# Patient Record
Sex: FEMALE | Race: WHITE
[De-identification: names, ages, dates, MRNs, and addresses within clinical notes are randomized per-mention and may not be internally consistent; named-entity substitution may affect disease eponyms.]

---

## 2018-07-11 ENCOUNTER — HOSPITAL ENCOUNTER (EMERGENCY)
Dept: HOSPITAL 25 - ED | Age: 38
Discharge: HOME | End: 2018-07-11
Payer: COMMERCIAL

## 2018-07-11 VITALS — SYSTOLIC BLOOD PRESSURE: 118 MMHG | DIASTOLIC BLOOD PRESSURE: 63 MMHG

## 2018-07-11 DIAGNOSIS — R07.89: Primary | ICD-10-CM

## 2018-07-11 DIAGNOSIS — F41.0: ICD-10-CM

## 2018-07-11 DIAGNOSIS — R06.02: ICD-10-CM

## 2018-07-11 DIAGNOSIS — R94.31: ICD-10-CM

## 2018-07-11 DIAGNOSIS — K52.9: ICD-10-CM

## 2018-07-11 DIAGNOSIS — R42: ICD-10-CM

## 2018-07-11 DIAGNOSIS — F17.210: ICD-10-CM

## 2018-07-11 LAB
HCT VFR BLD AUTO: 35 % (ref 35–47)
HGB BLD-MCNC: 12.2 G/DL (ref 12–16)
MCH RBC QN AUTO: 30 PG (ref 27–31)
MCHC RBC AUTO-ENTMCNC: 35 G/DL (ref 31–36)
MCV RBC AUTO: 86 FL (ref 80–97)
PLATELET # BLD AUTO: 187 10^3/UL (ref 150–450)
RBC # BLD AUTO: 4.05 10^6/UL (ref 4–5.4)
WBC # BLD AUTO: 7.6 10^3/UL (ref 3.5–10.8)

## 2018-07-11 PROCEDURE — 84484 ASSAY OF TROPONIN QUANT: CPT

## 2018-07-11 PROCEDURE — 99284 EMERGENCY DEPT VISIT MOD MDM: CPT

## 2018-07-11 PROCEDURE — 96360 HYDRATION IV INFUSION INIT: CPT

## 2018-07-11 PROCEDURE — 36415 COLL VENOUS BLD VENIPUNCTURE: CPT

## 2018-07-11 PROCEDURE — 85027 COMPLETE CBC AUTOMATED: CPT

## 2018-07-11 PROCEDURE — 93005 ELECTROCARDIOGRAM TRACING: CPT

## 2018-07-11 PROCEDURE — 84702 CHORIONIC GONADOTROPIN TEST: CPT

## 2018-07-11 PROCEDURE — 80053 COMPREHEN METABOLIC PANEL: CPT

## 2018-07-11 RX ADMIN — SODIUM CHLORIDE ONE MLS/HR: 900 IRRIGANT IRRIGATION at 18:02

## 2018-07-11 NOTE — ED
Progress





- Progress Note


Progress Note: 


This patient was signed out from Dr. Lopez to Dr. Mccarthy. The patient is 

feeling better and is pain free. In the ED she has been having no problems. The 

patient will be D/C with dx of atypical CP and gastroenteritis. The patient is 

agreeable with this plan.





Course/Dx





- Course


Course Of Treatment: I do not suspect acute coronary syndrome, she had a 

coincidental panic attack, she has been having dehydration and likely as well 

stomach irritation from soda and coffee, poor oral intake. Pt did exhibit 

hyperventilation syndrome. Plan for fluid replacement.  EKG reveals nl sinus 

rythym at 64 BPM, no STEMI.  Pt was given nl saline.





- Diagnoses


Provider Diagnoses: 


 Atypical chest pain, Gastroenteritis








Discharge





- Sign-Out/Discharge


Documenting (check all that apply): Patient Departure, Receiving Sign-Out


Receiving patient FROM: Gary Lopez





- Discharge Plan


Condition: Stable


Disposition: HOME


Patient Education Materials:  Chest Pain (ED), Gastroenteritis (ED)


Forms:  *Work Release


Referrals: 


Caitlin Velasquez MD [Primary Care Provider] - 3 Days


Additional Instructions: 


RETURN TO THE EMERGENCY DEPARTMENT FOR CHANGING OR WORSENING SYMPTOMS.

## 2018-07-11 NOTE — ED
HPI Chest Pain





- History of Current Complaint


Chief Complaint: EDChestPainROMI


Time Seen by Provider: 07/11/18 17:10


Hx Obtained From: Patient


Onset/Duration: Started Hours Ago, Still Present


Timing: Constant, Lasting Hours


Initial Severity: Moderate


Current Severity: Mild


Pain Intensity: 0


Pain Scale Used: 0-10 Numeric


Chest Pain Location: Mid Sternal


Chest Pain Radiates: No


Character: Dyspnea at Rest, Tightness


Aggravating Factor(s): Other: - Dehydration, anxiety, poor oral intake


Alleviating Factor(s): Nothing


Associated Signs and Symptoms: Positive: Chest Pain, Anxiety, Recent Stress - 

Illness, Tingling, Weakness, Shortness of Breath, Lightheadedness, Nausea, 

Vomiting





- Allergy/Home Medications


Allergies/Adverse Reactions: 


 Allergies











Allergy/AdvReac Type Severity Reaction Status Date / Time


 


No Known Allergies Allergy   Verified 07/11/18 17:17











Home Medications: 


 Home Medications





NK [No Home Medications Reported]  07/11/18 [History Confirmed 07/11/18]











PMH/Surg Hx/FS Hx/Imm Hx


Endocrine/Hematology History: 


   Denies: Hx Diabetes


Cardiovascular History: 


   Denies: Hx Hypertension, Hx Myocardial Infarction


Sensory History: 


   Denies: Hx Legally Blind


Opthamlomology History: 


   Denies: Hx Legally Blind


EENT History: 


   Denies: Hx Deafness


Psychiatric History: Reports: Hx Anxiety, Hx Panic Disorder


Infectious Disease History: No


Infectious Disease History: 


   Denies: Traveled Outside the [place] in Last 30 Days





- Family History


Known Family History: 


   Negative: Cardiac Disease





- Social History


Occupation: Employed Full-time


Hx Tobacco Use: Yes


Smoking Status (MU): Current Every Day Smoker


Type: Cigarettes





Review of Systems


Negative: Fever, Chills


Negative: Erythema


Negative: Sore Throat


Positive: Chest Pain


Positive: Shortness Of Breath.  Negative: Cough


Positive: Vomiting, Nausea.  Negative: Abdominal Pain


Negative: dysuria, hematuria


Negative: Myalgia, Edema


Negative: Rash


Neurological: Other - NEGATIVE: dizziness


Positive: Weakness, Paresthesia


Positive: Anxious


All Other Systems Reviewed And Are Negative: Yes





Physical Exam





- Summary


Physical Exam Summary: 





Constitutional: Well-developed, Well-nourished, Alert. (-) Distressed


Skin: Warm, Dry


HENT: Normocephalic; Atraumatic


Eyes: Conjunctiva normal


Neck: Musculoskeletal ROM normal neck. (-) JVD, (-) Stridor, (-) Tracheal 

deviation


Cardio: Rhythm regular, rate normal, Heart sounds normal; Intact distal pulses; 

The pedal pulses are 2+ and symmetric. Radial pulses are 2+ and symmetric. (-) 

Murmur


Pulmonary/Chest wall: Effort normal. (-) Respiratory distress, (-) Wheezes, (-) 

Rales


Abd: Soft, (-), epigastric tenderness, (-) Distension, (-) Guarding, (-) Rebound


Musculoskeletal: (-) Edema


Lymph: (-) Cervical adenopathy


Neuro: Alert, Oriented x3


Psych: Mood and affect Normal


Triage Information Reviewed: Yes


Vital Signs On Initial Exam: 


 Initial Vitals











Temp Pulse Resp BP Pulse Ox


 


 98.8 F   63   15   128/66   99 


 


 07/11/18 17:14  07/11/18 17:14  07/11/18 17:14  07/11/18 17:14  07/11/18 17:14











Vital Signs Reviewed: Yes





Diagnostics





- Vital Signs


 Vital Signs











  Temp Pulse Resp BP Pulse Ox


 


 07/11/18 17:14  98.8 F  63  15  128/66  99














- Laboratory


Result Diagrams: 


 07/11/18 17:23





 07/11/18 17:23


Lab Statement: Any lab studies that have been ordered have been reviewed, and 

results considered in the medical decision making process.





- EKG


  ** 1712


Cardiac Rate: NL - 64


EKG Rhythm: Sinus Rhythm


ST Segment: Normal


EKG Interpretation: No STEMI.





Chest Pain Course/Dx





- Course


Course Of Treatment: I do not suspect acute coronary syndrome, she had a 

coincidental panic attack, she has been having dehydration and likely as well 

stomach irritation from soda and coffee, poor oral intake. Pt did exhibit 

hyperventilation syndrome. Plan for fluid replacement.  EKG reveals nl sinus 

rythym at 64 BPM, no STEMI.  Pt was given nl saline.





Discharge





- Sign-Out/Discharge


Documenting (check all that apply): Sign-Out Patient


Signing out patient TO: Malgorzata Mccarthy - pending reeval, IV fluids





- Discharge Plan

## 2018-10-20 ENCOUNTER — HOSPITAL ENCOUNTER (EMERGENCY)
Dept: HOSPITAL 25 - ED | Age: 38
Discharge: HOME | End: 2018-10-20
Payer: COMMERCIAL

## 2018-10-20 VITALS — SYSTOLIC BLOOD PRESSURE: 122 MMHG | DIASTOLIC BLOOD PRESSURE: 65 MMHG

## 2018-10-20 DIAGNOSIS — F17.210: ICD-10-CM

## 2018-10-20 DIAGNOSIS — K08.89: Primary | ICD-10-CM

## 2018-10-20 PROCEDURE — 99282 EMERGENCY DEPT VISIT SF MDM: CPT

## 2018-10-20 PROCEDURE — 87651 STREP A DNA AMP PROBE: CPT

## 2018-10-20 NOTE — ED
Throat Pain/Nasal Congestion





- HPI Summary


HPI Summary: 


Pt presents w/ Rt lower dental pain since last week. She had a cavity filled 15 

yrs ago and filling recently fell out. Has had pain since last week when this 

happened. Then starting Wed, she develop diarrhea and subjective fevers/chills. 

Reduced appetite and vomiting this morning. Denies ab pain, rash, CP, SOB, 

difficulty breathing or swallowing, cough, sneezing. She has had some pain 

along Rt jaw radiating into ear and sinus on this side but no draniage or 

swelling. Kids are in school - possible sick exposure as pt's daughter had ab 

pain yesterday.





- History of Current Complaint


Chief Complaint: EDDentalPain


Time Seen by Provider: 10/20/18 14:12


Hx Obtained From: Patient





- Allergies/Home Medications


Allergies/Adverse Reactions: 


 Allergies











Allergy/AdvReac Type Severity Reaction Status Date / Time


 


No Known Allergies Allergy   Verified 07/11/18 17:17














PMH/Surg Hx/FS Hx/Imm Hx


Previously Healthy: Yes


Endocrine/Hematology History: 


   Denies: Hx Diabetes


Cardiovascular History: 


   Denies: Hx Hypertension, Hx Myocardial Infarction


Sensory History: 


   Denies: Hx Legally Blind, Hx Deafness


Opthamlomology History: 


   Denies: Hx Legally Blind


Psychiatric History: Reports: Hx Anxiety, Hx Panic Disorder


Infectious Disease History: No


Infectious Disease History: 


   Denies: Traveled Outside the [place] in Last 30 Days





- Family History


Known Family History: 


   Negative: Cardiac Disease





- Social History


Occupation: Unemployed -  - 4 kids


Lives: With Family


Alcohol Use: None


Substance Use Type: Reports: Marijuana


Substance Use Comment - Amount & Last Used: h/o opiate use - on suboxone and 

sober x 3 years


Hx Tobacco Use: Yes


Smoking Status (MU): Current Every Day Smoker


Type: Cigarettes


Amount Used/How Often: 1/2 PPD





Physical Exam


Vital Signs On Initial Exam: 


 Initial Vitals











Temp Pulse Resp BP Pulse Ox


 


 97.6 F   67   16   140/89   99 


 


 10/20/18 12:48  10/20/18 12:48  10/20/18 12:48  10/20/18 12:48  10/20/18 12:48











Appearance: Positive: Well-Appearing, No Pain Distress, Well-Nourished


Skin: Positive: Warm, Skin Color Reflects Adequate Perfusion, Dry - no erythema

, no edema, no lesions over Rt side of face/neck


Head/Face: Positive: Normal Head/Face Inspection


Eyes: Positive: Normal, EOMI, Conjunctiva Clear.  Negative: Conjunctiva 

Inflammed, Discharge


ENT: Positive: Hearing grossly normal, Pharynx normal, TMs normal, Sinus 

tenderness - Rt maxillary - mild, Uvula midline.  Negative: Nasal congestion, 

Nasal drainage, Tonsillar swelling, Tonsillar exudate, Trismus, Muffled voice, 

Hoarse voice


Dental: Positive: Gross Decay/Caries @ - #... - no erythema, no edema, no 

drainage - core of pulp is TTP


Neck: Positive: Supple, Nontender, No Lymphadenopathy.  Negative: Nuchal 

Rigidity


Respiratory/Lung Sounds: Positive: Clear to Auscultation, Breath Sounds 

Present.  Negative: Rales, Rhonchi, Wheezes


Cardiovascular: Positive: Normal, RRR, S1, S2.  Negative: Murmur, Rub


Abdomen Description: Positive: Nontender, No Organomegaly, Soft


Bowel Sounds: Positive: Present


Musculoskeletal: Positive: Normal, Strength/ROM Intact


Neurological: Positive: Normal, Sensory/Motor Intact, Alert, Oriented to Person 

Place, Time, CN Intact II-III


Psychiatric: Positive: Normal





Diagnostics





- Vital Signs


 Vital Signs











  Temp Pulse Resp BP Pulse Ox


 


 10/20/18 12:48  97.6 F  67  16  140/89  99














- Laboratory


Lab Results: 


 Lab Results











  10/20/18 10/20/18 Range/Units





  14:43 14:44 


 


Influenza A (Rapid)   Negative  (Negative)  


 


Influenza B (Rapid)   Negative  (Negative)  


 


Group A Strep Rapid  Negative   (Negative)  











Lab Statement: Any lab studies that have been ordered have been reviewed, and 

results considered in the medical decision making process.





EENT Course/Dx





- Course


Course Of Treatment: dental pain w/ possible infection - will start anbx and pt 

plans to f/u w/ dentist Monday.  Return to ED if danger s/sx present





- Diagnoses


Provider Diagnoses: 


 Pain, dental








Discharge





- Sign-Out/Discharge


Documenting (check all that apply): Patient Departure





- Discharge Plan


Condition: Stable


Disposition: HOME


Prescriptions: 


Clindamycin HCl 300 mg PO TID #30 capsule


Patient Education Materials:  Toothache (ED), Viral Syndrome (ED)


Referrals: 


Caitlin Velasquez MD [Primary Care Provider] - 


Additional Instructions: 


Follow-up with dentist Monday - call to schedule an appointment


You may also try conservative care such as satl water oral rinses, especially 

after meals


Alternate ibuprofen and acetaminophen for pain


You may also try warm compresses or ice (whichever feels better)


*If difficulty breathing or swallowing, return to the ED





- Billing Disposition and Condition


Condition: STABLE


Disposition: Home

## 2018-11-30 ENCOUNTER — HOSPITAL ENCOUNTER (EMERGENCY)
Dept: HOSPITAL 25 - ED | Age: 38
Discharge: HOME | End: 2018-11-30
Payer: COMMERCIAL

## 2018-11-30 DIAGNOSIS — T40.4X1A: ICD-10-CM

## 2018-11-30 DIAGNOSIS — F41.9: ICD-10-CM

## 2018-11-30 DIAGNOSIS — F32.9: Primary | ICD-10-CM

## 2018-11-30 DIAGNOSIS — F17.210: ICD-10-CM

## 2018-11-30 DIAGNOSIS — F19.10: ICD-10-CM

## 2018-11-30 LAB
BASOPHILS # BLD AUTO: 0.1 10^3/UL (ref 0–0.2)
EOSINOPHIL # BLD AUTO: 0.2 10^3/UL (ref 0–0.6)
HCT VFR BLD AUTO: 41 % (ref 35–47)
HGB BLD-MCNC: 13.8 G/DL (ref 12–16)
LYMPHOCYTES # BLD AUTO: 2.9 10^3/UL (ref 1–4.8)
MCH RBC QN AUTO: 29 PG (ref 27–31)
MCHC RBC AUTO-ENTMCNC: 34 G/DL (ref 31–36)
MCV RBC AUTO: 87 FL (ref 80–97)
MONOCYTES # BLD AUTO: 0.5 10^3/UL (ref 0–0.8)
NEUTROPHILS # BLD AUTO: 6.5 10^3/UL (ref 1.5–7.7)
NRBC # BLD AUTO: 0 10^3/UL
NRBC BLD QL AUTO: 0
PLATELET # BLD AUTO: 227 10^3/UL (ref 150–450)
RBC # BLD AUTO: 4.75 10^6/UL (ref 4–5.4)
WBC # BLD AUTO: 10.1 10^3/UL (ref 3.5–10.8)

## 2018-11-30 PROCEDURE — 80307 DRUG TEST PRSMV CHEM ANLYZR: CPT

## 2018-11-30 PROCEDURE — G0480 DRUG TEST DEF 1-7 CLASSES: HCPCS

## 2018-11-30 PROCEDURE — 80329 ANALGESICS NON-OPIOID 1 OR 2: CPT

## 2018-11-30 PROCEDURE — 99285 EMERGENCY DEPT VISIT HI MDM: CPT

## 2018-11-30 PROCEDURE — 36415 COLL VENOUS BLD VENIPUNCTURE: CPT

## 2018-11-30 PROCEDURE — 80320 DRUG SCREEN QUANTALCOHOLS: CPT

## 2018-11-30 PROCEDURE — 80053 COMPREHEN METABOLIC PANEL: CPT

## 2018-11-30 PROCEDURE — 84443 ASSAY THYROID STIM HORMONE: CPT

## 2018-11-30 PROCEDURE — 84484 ASSAY OF TROPONIN QUANT: CPT

## 2018-11-30 PROCEDURE — 85025 COMPLETE CBC W/AUTO DIFF WBC: CPT

## 2018-11-30 PROCEDURE — 81003 URINALYSIS AUTO W/O SCOPE: CPT

## 2018-11-30 PROCEDURE — 84702 CHORIONIC GONADOTROPIN TEST: CPT

## 2018-12-01 ENCOUNTER — HOSPITAL ENCOUNTER (INPATIENT)
Dept: HOSPITAL 25 - ED | Age: 38
LOS: 5 days | Discharge: HOME | DRG: 773 | End: 2018-12-06
Attending: PSYCHIATRY & NEUROLOGY | Admitting: PSYCHIATRY & NEUROLOGY
Payer: MEDICAID

## 2018-12-01 DIAGNOSIS — E66.9: ICD-10-CM

## 2018-12-01 DIAGNOSIS — N39.0: ICD-10-CM

## 2018-12-01 DIAGNOSIS — F17.200: ICD-10-CM

## 2018-12-01 DIAGNOSIS — Z87.442: ICD-10-CM

## 2018-12-01 DIAGNOSIS — F41.0: ICD-10-CM

## 2018-12-01 DIAGNOSIS — F90.9: ICD-10-CM

## 2018-12-01 DIAGNOSIS — B95.1: ICD-10-CM

## 2018-12-01 DIAGNOSIS — F32.9: ICD-10-CM

## 2018-12-01 DIAGNOSIS — F12.151: Primary | ICD-10-CM

## 2018-12-01 DIAGNOSIS — F11.20: ICD-10-CM

## 2018-12-01 LAB
BASOPHILS # BLD AUTO: 0 10^3/UL (ref 0–0.2)
EOSINOPHIL # BLD AUTO: 0.1 10^3/UL (ref 0–0.6)
HCT VFR BLD AUTO: 43 % (ref 35–47)
HGB BLD-MCNC: 14.1 G/DL (ref 12–16)
LYMPHOCYTES # BLD AUTO: 2.5 10^3/UL (ref 1–4.8)
MCH RBC QN AUTO: 29 PG (ref 27–31)
MCHC RBC AUTO-ENTMCNC: 33 G/DL (ref 31–36)
MCV RBC AUTO: 87 FL (ref 80–97)
MONOCYTES # BLD AUTO: 0.5 10^3/UL (ref 0–0.8)
NEUTROPHILS # BLD AUTO: 4.2 10^3/UL (ref 1.5–7.7)
NRBC # BLD AUTO: 0 10^3/UL
NRBC BLD QL AUTO: 0.1
PLATELET # BLD AUTO: 204 10^3/UL (ref 150–450)
RBC # BLD AUTO: 4.87 10^6/UL (ref 4–5.4)
RBC UR QL AUTO: (no result)
WBC # BLD AUTO: 7.5 10^3/UL (ref 3.5–10.8)
WBC UR QL AUTO: (no result)

## 2018-12-01 PROCEDURE — 80053 COMPREHEN METABOLIC PANEL: CPT

## 2018-12-01 PROCEDURE — 81003 URINALYSIS AUTO W/O SCOPE: CPT

## 2018-12-01 PROCEDURE — 87077 CULTURE AEROBIC IDENTIFY: CPT

## 2018-12-01 PROCEDURE — 81015 MICROSCOPIC EXAM OF URINE: CPT

## 2018-12-01 PROCEDURE — 99238 HOSP IP/OBS DSCHRG MGMT 30/<: CPT

## 2018-12-01 PROCEDURE — 83036 HEMOGLOBIN GLYCOSYLATED A1C: CPT

## 2018-12-01 PROCEDURE — 80329 ANALGESICS NON-OPIOID 1 OR 2: CPT

## 2018-12-01 PROCEDURE — 80061 LIPID PANEL: CPT

## 2018-12-01 PROCEDURE — 99231 SBSQ HOSP IP/OBS SF/LOW 25: CPT

## 2018-12-01 PROCEDURE — G0480 DRUG TEST DEF 1-7 CLASSES: HCPCS

## 2018-12-01 PROCEDURE — 85025 COMPLETE CBC W/AUTO DIFF WBC: CPT

## 2018-12-01 PROCEDURE — 99222 1ST HOSP IP/OBS MODERATE 55: CPT

## 2018-12-01 PROCEDURE — 80320 DRUG SCREEN QUANTALCOHOLS: CPT

## 2018-12-01 PROCEDURE — 87086 URINE CULTURE/COLONY COUNT: CPT

## 2018-12-01 PROCEDURE — 80307 DRUG TEST PRSMV CHEM ANLYZR: CPT

## 2018-12-01 PROCEDURE — 84443 ASSAY THYROID STIM HORMONE: CPT

## 2018-12-01 PROCEDURE — 36415 COLL VENOUS BLD VENIPUNCTURE: CPT

## 2018-12-01 PROCEDURE — 99284 EMERGENCY DEPT VISIT MOD MDM: CPT

## 2018-12-01 RX ADMIN — RISPERIDONE ONE: 1 TABLET ORAL at 21:08

## 2018-12-01 RX ADMIN — RISPERIDONE SCH: 1 TABLET ORAL at 23:39

## 2018-12-01 RX ADMIN — RISPERIDONE ONE MG: 1 TABLET ORAL at 21:14
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
## 2018-12-01 NOTE — ED
Progress





- Progress Note


Progress Note: 





The pt is a 38 y.[facility] She was signed out by Dr. Whitt to Dr. Arnold. Chief complaint was stated to be psychiatric complaint 

where she noted to be hearing voices.  





- Consult/PCP


Time Called: 17:00





Course/Dx





- Course


Course Of Treatment: The pt is a 38 y.o female signed out by Dr. Whitt. The dx 

will be psychosis The pt is currently pending MHE. Per  evaluator, pt was 

cleared and will be admitted to the [facility]. She was a voluntary admission to the 

[facility].





- Diagnoses


Provider Diagnoses: 


 Psychosis








Discharge





- Sign-Out/Discharge


Documenting (check all that apply): Patient Departure - Admission to 

Psychiatric facility





- Discharge Plan


Condition: Stable


Disposition: PSYCHIATRIC FACILITY-[facility]





- Billing Disposition and Condition


Condition: STABLE


Disposition: Psychiatric Facility [facility]





- Attestation Statements


Document Initiated by Scribe: Yes


Documenting Scribe: Talat Fierro


Provider For Whom Scribe is Documenting (Include Credential): Dr. Dee Arnold


Scribe Attestation: 


Talat MCCORMICK scribed for Dr. Dee Arnold on 12/02/18 at 0546. 


Scribe Documentation Reviewed: Yes


Provider Attestation: 


The documentation as recorded by the Talat mariscal accurately reflects 

the service I personally performed and the decisions made by Dr. Dee frye


Status of Scribe Document: Viewed

## 2018-12-02 LAB
RBC UR QL AUTO: (no result)
WBC UR QL AUTO: (no result)

## 2018-12-02 RX ADMIN — BUPRENORPHINE HYDROCHLORIDE, NALOXONE HYDROCHLORIDE SCH EACH: 8; 2 FILM, SOLUBLE BUCCAL; SUBLINGUAL at 20:55

## 2018-12-02 RX ADMIN — NICOTINE SCH PATCH: 21 PATCH TRANSDERMAL at 09:07

## 2018-12-02 RX ADMIN — THERA TABS SCH TAB: TAB at 09:07

## 2018-12-02 RX ADMIN — NICOTINE PRN MG: 4 INHALANT RESPIRATORY (INHALATION) at 09:08

## 2018-12-02 RX ADMIN — SERTRALINE HYDROCHLORIDE SCH MG: 100 TABLET, FILM COATED ORAL at 09:07

## 2018-12-02 RX ADMIN — BUPRENORPHINE HYDROCHLORIDE, NALOXONE HYDROCHLORIDE SCH EACH: 8; 2 FILM, SOLUBLE BUCCAL; SUBLINGUAL at 09:07

## 2018-12-02 RX ADMIN — RISPERIDONE SCH MG: 1 TABLET ORAL at 20:54

## 2018-12-02 RX ADMIN — ACETAMINOPHEN PRN MG: 325 TABLET ORAL at 20:54

## 2018-12-02 RX ADMIN — WATER SCH NOTE: 100 INJECTION, SOLUTION INTRAVENOUS at 21:56

## 2018-12-02 NOTE — HP
HISTORY AND PHYSICAL:

 

DATE OF ADMISSION:  12/01/18

 

IDENTIFYING DATA:  Gianna is a 38-year-old   female with at 
least 3 prior psychiatric hospitalizations who was brought in to the emergency 
department by her  due to experiencing paranoia, both auditory or visual 
hallucinations.

 

HISTORY OF PRESENT ILLNESS:  The patient has been hearing multiple voices of 
known people who were having conversations among themselves and at times seeing 
people in the field behind her home.  However, today during the evaluation, she 
denied experiencing any visual hallucinations, although she heard the same 
voices once or twice during the day.  Otherwise, she denies any mood symptoms 
at this time.  She and her  got  for approximately a week due 
to not getting along and fighting in front of the kids.  Gianna moved out of the 
house into her mother's and stayed there away from her .  She took her 
children with her.  She also reports that she has been eating and sleeping 
well.  Her  reported that in recent past, Gianna scratched herself on the 
chest with a broken butter knife. Gianna reports of having financial, emotional, 
and work-related problems.

 

PAST PSYCHIATRIC HISTORY:  As mentioned in the HPI, this is her 4th psychiatric 
hospitalization.  Last hospitalization on this unit was on 01/06/16.  Prior to 
that, she was hospitalized in 2014 and 2012.  Her prior hospitalizations were 
almost under similar circumstances.

 

PAST MEDICAL HISTORY:  Unremarkable.

 

ALLERGIES:  No known drug allergies.

 

SUBSTANCE ABUSE HISTORY:  Gianna appears to be minimizing her substance use 
history. Her  urine drug screen was positive for cannabinoids as well as 
benzodiazepines. During the evaluation in the emergency room, she reported to 
the ED provider that she overdosed on multiple Suboxone.  Today, she reports 
that she has been smoking marijuana at least couple of times daily and she has 
been doing it for years.  In the past, for approximately 5 or 6 years, she was 
using heroin and methamphetamine, used IV drugs.  For last 3 to 4 years, she 
has been on Suboxone and reports that she had used heroin in between for brief 
period of time.

 

FAMILY PSYCHIATRIC HISTORY:  Unremarkable.

 

PERSONAL AND SOCIAL HISTORY:  Gianna dropped out of high school, but later 
received GED.  She has been living with her  up until a week ago; however
, moved out to her mother's because of relationship problem.  She was  
to her  in 2013 and has 1 son.  She has 2 other sons from prior 
relationships.

 

                               PHYSICAL EXAMINATION

 

GENERAL:  Physical exam was offered, but Gianna declined because in her opinion 
she had a recent physical exam in the emergency department.  Review of 
emergency records indicate that she does not have any acute or chronic physical 
health conditions.  At this time, she is not in any acute distress.

 

VITAL SIGNS:  Show a blood pressure of 122/80, pulse 73, respirations 18, 
temperature 97.4, pulse ox 99% on room air.

 

Gianna is a well-built, average height, moderately obese,  female, who 
is appropriately dressed and neatly groomed.

 

 LABORATORY DATA:  Review of labs shows a WBC count of 7.5, hemoglobin 14.1, 
hematocrit 43, platelets 204.  Chemistry profile shows serum sodium level of 141
, potassium 3.3 slightly lower than normal range, chloride 104, carbon dioxide 
28, BUN 11, creatinine 0.79.  Rest of the report appears to be unremarkable 
except for toxicology screen shows positive for cannabinoids and 
benzodiazepines.

 

MENTAL STATUS EXAM:  Gianna is alert and oriented to time, place, and person.  
Makes good eye contact.  Speech is normal in all spheres.  Describes her mood 
as "fine." Observed affect appears to be euthymic.  Her intelligence appears to 
be average as evidenced by her vocabulary and fund of knowledge.  Memory 
functions are intact in all spheres.  Reports of experiencing auditory 
hallucinations at this time of multiple known voices conversing among each other
, which is much less than it was the entire last week.  Denies experiencing any 
visual hallucinations.  Her thought processes are logical and goal directed.  
Denies any paranoia at this time, although she reported of having paranoid 
thoughts prior to admission.  Insight and judgement appears to be poor.

 

SUMMARY:  This 38-year-old ,  female, mother of 3 children with 
known history of mood and psychotic symptoms in the context of substance use.  
She was hospitalized 3 times prior to this because of same reason.

 

DIAGNOSTIC IMPRESSION:

 

MENTAL HEALTH DIAGNOSIS:  Substance-induced psychosis, rule out other psychotic 
disorders including schizophrenia.

 

PHYSICAL HEALTH DIAGNOSES:  None.

 

TREATMENT RECOMMENDATIONS:  Gianna will remain hospitalized on behavioral 
science unit for her safety and rapid stabilization of psychotic symptoms.  
Supportive milieu, individual and group therapy will be initiated.  Her code 
status will remain full.  She will be continued on her outpatient medications 
plus Risperdal 1 mg at h.s.  Psychopharmacological treatment will be deferred 
to her assigned psychiatrist on the unit, if there is any need for adjustment 
to the dose or change of medications.  It looks like her psychosis was related 
to substance use and the hospitalization should be a very brief one.  Gianna 
will benefit from referral to a substance use rehab followed by regular 
attendance to self-help groups.  Gianna at this time does not have any 
psychiatrist and her Suboxone is prescribed by her primary care physician.

 

 

 

[id number]/[facility] #: [id number]

MTDD

## 2018-12-03 RX ADMIN — WATER SCH NOTE: 100 INJECTION, SOLUTION INTRAVENOUS at 20:46

## 2018-12-03 RX ADMIN — ACETAMINOPHEN PRN MG: 325 TABLET ORAL at 06:10

## 2018-12-03 RX ADMIN — ACETAMINOPHEN PRN MG: 325 TABLET ORAL at 18:34

## 2018-12-03 RX ADMIN — NICOTINE SCH PATCH: 21 PATCH TRANSDERMAL at 08:34

## 2018-12-03 RX ADMIN — THERA TABS SCH TAB: TAB at 08:36

## 2018-12-03 RX ADMIN — SERTRALINE HYDROCHLORIDE SCH MG: 100 TABLET, FILM COATED ORAL at 08:35

## 2018-12-03 RX ADMIN — BUPRENORPHINE HYDROCHLORIDE, NALOXONE HYDROCHLORIDE SCH EACH: 8; 2 FILM, SOLUBLE BUCCAL; SUBLINGUAL at 20:33

## 2018-12-03 RX ADMIN — CEFDINIR SCH MG: 300 CAPSULE ORAL at 16:24

## 2018-12-03 RX ADMIN — BUPRENORPHINE HYDROCHLORIDE, NALOXONE HYDROCHLORIDE SCH EACH: 8; 2 FILM, SOLUBLE BUCCAL; SUBLINGUAL at 08:37

## 2018-12-03 NOTE — PN
Subjective





- Subjective


Date of Service: 12/03/18


Service Type: 96999 Hosp care 15 min low complexity


Subjective: 





Patient was seen by self, discussed with treatment team, chart was reviewed. 

Patient has been compliant with her medications, no reported side effects. 

Patient reports further improvement in her symptoms of psychosis of 

hallucination , no paranoia reported. Patient reports that she has not used any 

benzodiazepine and when educated about her urine toxicology reported that her 

marijuana might have been laced with other substances. Patient reported no 

history of sustained psychotic symptoms in the past. Patient sleeping has been 

fair with Risperidone. Patient eating has been fair. Patient has been 

cooperative with staff and attending groups. Patient behavior has been in 

control. Patient mood was less anxious and less dysphoric. Patient has been 

reporting no suicidal or homicidal ideation and willing to work with outpatient 

substance abuse treatment and setting up goals for her self at the house to 

help abstain from substance and prevent relapse. Patient urine culture was 

positive for Group B strep and reports having urinary frequency and some pain 

pain.





Objective





- Appearance


Appearance: Healthy Appearing


Dysmorphic Features: No


Hygiene: Normal


Grooming: Fairly Well Kept





- Behavior


Psychomotor Activities: Normal


Exhibits Abnormal Movement: No





- Attitude and Relatedness


Attitude and Relatedness: Cooperative


Eye Contact: Fair





- Speech


Quality: Unpressured


Latencies: Normal


Quantity: Terse





- Mood


Patient's Decription of Mood: "Anxious"





- Affect


Observed Affect: Fair


Affect Consistent with: Dysphoria - less





- Thought Process


Patient's Thought Process: Goal Directed


Thought Content: No Passive Death Wish, No Suicidal Planning, No Homicidal 

Ideation, No Paranoid Ideation





- Sensorium


Experiencing Hallucinations: No, Sensorium is Clear


Type of Hallucinations: Visual: No, Auditory: No, Command: No





- Level of Consciousness


Level of Consciousness: Alert


Orientation: No Intact, No Orientated to Time, No Orientated to Place, No 

Orientated to Person





- Impulse Control


Impulse Control: Intact





- Insight and Judgement


Insight and Judgement: Fair





- Group Participation


Particating in Group Activities: Yes





- Medication Management


Medication Management Adherence: Yes





Assessment





- Assessment


Merits Inpatient Hospitalization: For Immediate Safety, For Stabilization, For 

Discharge Planning


Inpatient DSM-V Dx: F12.151


Clinical Impression: 





This 38-year-old ,  female, mother of 3 children with known 

history of mood and psychotic symptoms in the context of substance use.  She 

was hospitalized 3 times prior to this because of same reason.





MHU: Problem List





- Patient Problems


(1) Substance-induced psychotic disorder


Current Visit: Yes   Status: Acute   Code(s): F19.959 - OTH PSYCHOACTV 

SUBSTANCE USE, UNSP W PSYCH DISORDER, UNSP   SNOMED Code(s): 696857403519659267


   





(2) Major depression


Current Visit: No   Status: Acute   Priority: High   Onset Date: 01/05/16   Code

(s): F32.9 - MAJOR DEPRESSIVE DISORDER, SINGLE EPISODE, UNSPECIFIED   SNOMED 

Code(s): 998227816


   





(3) Anxiety


Current Visit: No   Status: Chronic   Priority: High   Onset Date: 12/13/14   

Code(s): F41.9 - ANXIETY DISORDER, UNSPECIFIED   SNOMED Code(s): 79518584


   





(4) Hx of opioid abuse


Current Visit: No   Status: Chronic   Priority: Medium   Code(s): Z87.898 - 

PERSONAL HISTORY OF OTHER SPECIFIED CONDITIONS   SNOMED Code(s): 597173607


   Comment: takes opiates daily   





Plan





- Plan


Treatment Plan: 


Name: WINTER RUGGIERO                        


YOB: 1980                        


[id number]


[id number]








- Patient continues to be hospitalized due to psychosis, impulsive behavior, 

substance abuse and depression.


- Patient's medications were adjusted after informed consent with switching of 

Risperidone to Seroquel 25 mg at bedtime to help with sleep as psychotic 

symptoms has nearly resolved with short Risperidone trial. Patient to continue 

with Zoloft 200 mg PO QAM. Continue with Suboxone 8 mg BID for now.


-Hospitalist on call was called to discuss UTI, patient was started on 

Cefpodoxime 200 mg PO Q12 HRS for three days.


- Patient will be monitored for improvement and side effects including relapse 

in psychosis. Risk and benefits were discussed.


- Patient was encouraged to continue his participation in the milieu, group and 

individual therapy.


Medications: 


 Current Medications





Acetaminophen (Tylenol Tab*)  650 mg PO Q4H PRN


   PRN Reason: PAIN or TEMP > 101 F


   Last Admin: 12/03/18 06:10 Dose:  650 mg


Al Hydrox/Mg Hydrox/Simethicone (Maalox Plus*)  30 ml PO Q4H PRN


   PRN Reason: INDIGESTION


   Last Admin: 12/02/18 22:18 Dose:  30 ml


Buprenorphine/Naloxone (Suboxone 8 Mg-2 Mg Sl Film)  1 each SL FILM BID [facility]


   Last Admin: 12/03/18 08:37 Dose:  1 each


Multivitamins (Theragran Tab*)  1 tab PO DAILY [facility]


   Last Admin: 12/03/18 08:36 Dose:  1 tab


Nicotine (Nicotine Inhaler*)  10 mg INH Q2H PRN


   PRN Reason: CRAVING


   Last Admin: 12/02/18 09:08 Dose:  10 mg


Nicotine (Nicotine Patch 21 Mg/24 Hr*)  1 patch TRANSDERM DAILY [facility]


   Last Admin: 12/03/18 08:34 Dose:  1 patch


Pharmacy Profile Note (Nicotine Patch Removal Note*)  1 note PATCH OFF 2100 [facility]


   Last Admin: 12/02/18 21:56 Dose:  1 note


Quetiapine Fumarate (Seroquel Tab*)  25 mg PO BEDTIME [facility]


Sertraline HCl (Zoloft*)  200 mg PO QAM [facility]


   Last Admin: 12/03/18 08:35 Dose:  200 mg

## 2018-12-04 RX ADMIN — THERA TABS SCH TAB: TAB at 08:52

## 2018-12-04 RX ADMIN — BUPRENORPHINE HYDROCHLORIDE, NALOXONE HYDROCHLORIDE SCH: 8; 2 FILM, SOLUBLE BUCCAL; SUBLINGUAL at 22:45

## 2018-12-04 RX ADMIN — CEFDINIR SCH MG: 300 CAPSULE ORAL at 13:47

## 2018-12-04 RX ADMIN — BUPRENORPHINE HYDROCHLORIDE, NALOXONE HYDROCHLORIDE SCH EACH: 8; 2 FILM, SOLUBLE BUCCAL; SUBLINGUAL at 08:50

## 2018-12-04 RX ADMIN — CEFDINIR SCH MG: 300 CAPSULE ORAL at 02:00

## 2018-12-04 RX ADMIN — WATER SCH NOTE: 100 INJECTION, SOLUTION INTRAVENOUS at 20:55

## 2018-12-04 RX ADMIN — Medication ONE EACH: at 07:34

## 2018-12-04 RX ADMIN — RISPERIDONE SCH MG: 1 TABLET ORAL at 20:48

## 2018-12-04 RX ADMIN — NICOTINE PRN MG: 4 INHALANT RESPIRATORY (INHALATION) at 07:34

## 2018-12-04 RX ADMIN — NICOTINE SCH PATCH: 21 PATCH TRANSDERMAL at 08:51

## 2018-12-04 RX ADMIN — SERTRALINE HYDROCHLORIDE SCH MG: 100 TABLET, FILM COATED ORAL at 08:51

## 2018-12-04 RX ADMIN — RISPERIDONE SCH MG: 1 TABLET ORAL at 10:40

## 2018-12-04 NOTE — PN
Subjective





- Subjective


Date of Service: 12/04/18


Service Type: 83325 Hosp care 15 min low complexity


Subjective: 





Patient was seen by self, discussed with treatment team, chart was reviewed. 

Patient has been compliant with her medications, no reported side effects. 

Patient was in distress today with anxiety reportedly had a relapse and 

worsening in symptoms of psychosis with paranoia and auditory hallucinations. 

Patient was responding to internal stimuli couldn't sleep last night. Patient 

eating has been fair. Patient has been cooperative with staff otherwise. 

Patient behavior was unpredictable but was safe on all checks. Patient mood was 

more anxious and dysphoric. Patient has been reporting suicidal ideation but no 

homicidal ideation and willing to work with outpatient substance abuse 

treatment and setting up goals for her self at the house to help abstain from 

substance and prevent relapse. Patient on antibiotic therapy for UTI.  





Objective





- Appearance


Appearance: Healthy Appearing


Dysmorphic Features: No


Hygiene: Normal


Grooming: Fairly Well Kept





- Behavior


Psychomotor Activities: Normal


Exhibits Abnormal Movement: No





- Attitude and Relatedness


Attitude and Relatedness: Cooperative


Eye Contact: Fair





- Speech


Quality: Unpressured


Latencies: Normal


Quantity: Terse





- Mood


Patient's Decription of Mood: "Anxious"





- Affect


Observed Affect: Tense


Affect Consistent with: Dysphoria





- Thought Process


Patient's Thought Process: Goal Directed


Thought Content: Yes Passive Death Wish, Yes Paranoid Ideation, No Suicidal 

Planning, No Homicidal Ideation





- Sensorium


Experiencing Hallucinations: No, Sensorium is Clear


Type of Hallucinations: Visual: No, Auditory: Yes, Command: No





- Level of Consciousness


Level of Consciousness: Alert


Orientation: Yes Intact, Yes Orientated to Time, Yes Orientated to Place, Yes 

Orientated to Person





- Impulse Control


Impulse Control: Intact





- Insight and Judgement


Insight and Judgement: Fair





- Group Participation


Particating in Group Activities: Yes





- Medication Management


Medication Management Adherence: Yes





Assessment





- Assessment


Merits Inpatient Hospitalization: For Immediate Safety, For Stabilization, For 

Discharge Planning


Inpatient DSM-V Dx: F12.151


Clinical Impression: 





This 38-year-old ,  female, mother of 3 children with known 

history of mood and psychotic symptoms in the context of substance use.  She 

was hospitalized 3 times prior to this because of same reason.





MHU: Problem List





- Patient Problems


(1) Substance-induced psychotic disorder


Current Visit: Yes   Status: Acute   Code(s): F19.959 - OTH PSYCHOACTV 

SUBSTANCE USE, UNSP W PSYCH DISORDER, UNSP   SNOMED Code(s): 771537322425789897


   





(2) Major depression


Current Visit: No   Status: Acute   Priority: High   Onset Date: 01/05/16   Code

(s): F32.9 - MAJOR DEPRESSIVE DISORDER, SINGLE EPISODE, UNSPECIFIED   SNOMED 

Code(s): 328578120


   





(3) Anxiety


Current Visit: No   Status: Chronic   Priority: High   Onset Date: 12/13/14   

Code(s): F41.9 - ANXIETY DISORDER, UNSPECIFIED   SNOMED Code(s): 67778174


   





(4) Hx of opioid abuse


Current Visit: No   Status: Chronic   Priority: Medium   Code(s): Z87.898 - 

PERSONAL HISTORY OF OTHER SPECIFIED CONDITIONS   SNOMED Code(s): 993067801


   Comment: takes opiates daily   





Plan





- Plan


Treatment Plan: 


Name: WINTER RUGGIERO                        


YOB: 1980                        


[id number]


[id number]








- Patient continues to be hospitalized due to psychosis, impulsive behavior, 

substance abuse and depression.


- Patient's Seroquel was discontinued and was started back on Risperidone 0.5 

mg PO QAM and 1 mg PO QHS. Patient to continue with Zoloft 200 mg PO QAM. 

Continue with Suboxone 8 mg BID for now.


-Patient on Antibiotic therapy of three days for UTI.


- Patient will be monitored for improvement and side effects including relapse 

in psychosis. Risk and benefits were discussed.


- Patient was encouraged to continue his participation in the milieu, group and 

individual therapy.


Medications: 


 Current Medications





Acetaminophen (Tylenol Tab*)  650 mg PO Q4H PRN


   PRN Reason: PAIN or TEMP > 101 F


   Last Admin: 12/03/18 18:34 Dose:  650 mg


Al Hydrox/Mg Hydrox/Simethicone (Maalox Plus*)  30 ml PO Q4H PRN


   PRN Reason: INDIGESTION


   Last Admin: 12/02/18 22:18 Dose:  30 ml


Buprenorphine/Naloxone (Suboxone 8 Mg-2 Mg Sl Film)  1 each SL FILM BID [facility]


   Last Admin: 12/04/18 08:50 Dose:  1 each


Cefdinir (Cefdinir Cap (Nf))  300 mg PO Q12H [facility]


   Stop: 12/06/18 02:01


   Last Admin: 12/04/18 02:00 Dose:  300 mg


Multivitamins (Theragran Tab*)  1 tab PO DAILY [facility]


   Last Admin: 12/04/18 08:52 Dose:  1 tab


Nicotine (Nicotine Inhaler*)  10 mg INH Q2H PRN


   PRN Reason: CRAVING


   Last Admin: 12/04/18 07:34 Dose:  10 mg


Nicotine (Nicotine Patch 21 Mg/24 Hr*)  1 patch TRANSDERM DAILY [facility]


   Last Admin: 12/04/18 08:51 Dose:  1 patch


Pharmacy Profile Note (Nicotine Patch Removal Note*)  1 note PATCH OFF 2100 [facility]


   Last Admin: 12/03/18 20:46 Dose:  1 note


Risperidone (Risperdal*)  0.5 mg PO DAILY [facility]


   Last Admin: 12/04/18 10:40 Dose:  0.5 mg


Risperidone (Risperdal*)  1 mg PO BEDTIME [facility]


Sertraline HCl (Zoloft*)  200 mg PO QAM [facility]


   Last Admin: 12/04/18 08:51 Dose:  200 mg

## 2018-12-05 RX ADMIN — SERTRALINE HYDROCHLORIDE SCH: 100 TABLET, FILM COATED ORAL at 12:00

## 2018-12-05 RX ADMIN — CEFDINIR SCH MG: 300 CAPSULE ORAL at 03:28

## 2018-12-05 RX ADMIN — THERA TABS SCH TAB: TAB at 11:56

## 2018-12-05 RX ADMIN — CEFDINIR SCH MG: 300 CAPSULE ORAL at 14:50

## 2018-12-05 RX ADMIN — BUPRENORPHINE HYDROCHLORIDE, NALOXONE HYDROCHLORIDE SCH: 8; 2 FILM, SOLUBLE BUCCAL; SUBLINGUAL at 12:00

## 2018-12-05 RX ADMIN — BUPRENORPHINE HYDROCHLORIDE, NALOXONE HYDROCHLORIDE SCH EACH: 8; 2 FILM, SOLUBLE BUCCAL; SUBLINGUAL at 20:22

## 2018-12-05 RX ADMIN — ACETAMINOPHEN PRN MG: 325 TABLET ORAL at 15:51

## 2018-12-05 RX ADMIN — NICOTINE PRN MG: 4 INHALANT RESPIRATORY (INHALATION) at 15:17

## 2018-12-05 RX ADMIN — NICOTINE SCH PATCH: 21 PATCH TRANSDERMAL at 15:16

## 2018-12-05 RX ADMIN — Medication ONE EACH: at 15:17

## 2018-12-05 RX ADMIN — RISPERIDONE SCH MG: 1 TABLET ORAL at 20:22

## 2018-12-05 RX ADMIN — RISPERIDONE SCH MG: 1 TABLET ORAL at 11:56

## 2018-12-05 RX ADMIN — NICOTINE SCH: 21 PATCH TRANSDERMAL at 12:00

## 2018-12-05 NOTE — PN
Subjective





- Subjective


Date of Service: 12/05/18


Service Type: 84489 Hosp care 15 min low complexity


Subjective: 





Patient was seen by self, discussed with treatment team, chart was reviewed. 

Patient was not compliant with her medications this morning, no reported side 

effects. Patient was focused on Seroquel making her psychotic and was tried to 

explain about low dose of Seroquel might not be sufficient to help her with 

psychotic symptoms that was controlled with Risperidone. Patient tolerated 

Risperidone well last night but this morning has been internally preoccupied 

with in her own thoughts pacing on the unit. Patient rationalizing not needing 

hospitalization and wanting to be discharge. Patient signed 72 hours asking for 

discharge. Patient  Patient was responding to internal stimuli couldn't sleep 

last night. Patient eating has been fair. Patient has been cooperative with 

staff otherwise. Patient behavior was unpredictable but was safe on all checks. 

Patient mood was more anxious and pacing on the unit. Patient reports no 

suicidal ideation and no homicidal ideation and willing to work with outpatient 

substance abuse treatment and setting up goals for her self at the house to 

help abstain from substance and prevent relapse. Patient took her antibiotic 

therapy for UTI.  





Objective





- Appearance


Appearance: Healthy Appearing


Dysmorphic Features: No


Hygiene: Normal


Grooming: Fairly Well Kept





- Behavior


Psychomotor Activities: Abnormal-Increased - pacing on the unit


Exhibits Abnormal Movement: No





- Attitude and Relatedness


Attitude and Relatedness: Psychotically Related - less cooperative


Eye Contact: Fair





- Speech


Quality: Unpressured


Latencies: Normal


Quantity: Terse





- Mood


Patient's Decription of Mood: "Irritable"





- Affect


Observed Affect: Tense


Affect Consistent with: Dysphoria





- Thought Process


Patient's Thought Process: Coherent


Thought Content: No Passive Death Wish, No Suicidal Planning, No Homicidal 

Ideation, No Paranoid Ideation - but internally preoccpied in her thougths





- Sensorium


Experiencing Hallucinations: No, Sensorium is Clear


Type of Hallucinations: Visual: No, Auditory: No, Command: No





- Level of Consciousness


Level of Consciousness: Alert


Orientation: Yes Intact, Yes Orientated to Time, Yes Orientated to Place, Yes 

Orientated to Person





- Impulse Control


Impulse Control: Poor





- Insight and Judgement


Insight and Judgement: Poor





- Group Participation


Particating in Group Activities: No





- Medication Management


Medication Management Adherence: No





Assessment





- Assessment


Merits Inpatient Hospitalization: For Immediate Safety, For Stabilization, For 

Discharge Planning


Inpatient DSM-V Dx: F12.151


Clinical Impression: 





This 38-year-old ,  female, mother of 3 children with known 

history of mood and psychotic symptoms in the context of substance use.  She 

was hospitalized 3 times prior to this because of same reason.





MHU: Problem List





- Patient Problems


(1) Substance-induced psychotic disorder


Current Visit: Yes   Status: Acute   Code(s): F19.959 - OTH PSYCHOACTV 

SUBSTANCE USE, UNSP W PSYCH DISORDER, UNSP   SNOMED Code(s): 389661317099933584


   





(2) Major depression


Current Visit: No   Status: Acute   Priority: High   Onset Date: 01/05/16   Code

(s): F32.9 - MAJOR DEPRESSIVE DISORDER, SINGLE EPISODE, UNSPECIFIED   SNOMED 

Code(s): 494471430


   





(3) Anxiety


Current Visit: No   Status: Chronic   Priority: High   Onset Date: 12/13/14   

Code(s): F41.9 - ANXIETY DISORDER, UNSPECIFIED   SNOMED Code(s): 08910446


   





(4) Hx of opioid abuse


Current Visit: No   Status: Chronic   Priority: Medium   Code(s): Z87.898 - 

PERSONAL HISTORY OF OTHER SPECIFIED CONDITIONS   SNOMED Code(s): 734455726


   Comment: takes opiates daily   





Plan





- Plan


Treatment Plan: 


Name: WINTER RUGGIERO                        


YOB: 1980                        


[id number]


[id number]








- Patient continues to be hospitalized due to psychosis, impulsive behavior, 

substance abuse and depression.


- Patient's was encouraged to take Risperidone 0.5 mg PO QAM and 1 mg PO QHS. 

Patient to continue with Zoloft 200 mg PO QAM. Continue with Suboxone 8 mg BID 

for now.


-Patient on Antibiotic therapy of three days for UTI.


- Patient will be monitored for improvement and side effects including relapse 

in psychosis. Risk and benefits were discussed.


- Patient was encouraged to continue his participation in the milieu, group and 

individual therapy.


Medications: 


 Current Medications





Acetaminophen (Tylenol Tab*)  650 mg PO Q4H PRN


   PRN Reason: PAIN or TEMP > 101 F


   Last Admin: 12/03/18 18:34 Dose:  650 mg


Al Hydrox/Mg Hydrox/Simethicone (Maalox Plus*)  30 ml PO Q4H PRN


   PRN Reason: INDIGESTION


   Last Admin: 12/02/18 22:18 Dose:  30 ml


Buprenorphine/Naloxone (Suboxone 8 Mg-2 Mg Sl Film)  1 each SL FILM BID [facility]


   Last Admin: 12/04/18 22:45 Dose:  Not Given


Cefdinir (Cefdinir Cap (Nf))  300 mg PO Q12H [facility]


   Stop: 12/06/18 02:01


   Last Admin: 12/05/18 03:28 Dose:  300 mg


Multivitamins (Theragran Tab*)  1 tab PO DAILY [facility]


   Last Admin: 12/04/18 08:52 Dose:  1 tab


Nicotine (Nicotine Inhaler*)  10 mg INH Q2H PRN


   PRN Reason: CRAVING


   Last Admin: 12/04/18 07:34 Dose:  10 mg


Nicotine (Nicotine Patch 21 Mg/24 Hr*)  1 patch TRANSDERM DAILY [facility]


   Last Admin: 12/04/18 08:51 Dose:  1 patch


Pharmacy Profile Note (Nicotine Patch Removal Note*)  1 note PATCH OFF 2100 [facility]


   Last Admin: 12/04/18 20:55 Dose:  1 note


Risperidone (Risperdal*)  0.5 mg PO DAILY [facility]


   Last Admin: 12/04/18 10:40 Dose:  0.5 mg


Risperidone (Risperdal*)  1 mg PO BEDTIME [facility]


   Last Admin: 12/04/18 20:48 Dose:  1 mg


Sertraline HCl (Zoloft*)  200 mg PO QAM [facility]


   Last Admin: 12/04/18 08:51 Dose:  200 mg

## 2018-12-06 VITALS — SYSTOLIC BLOOD PRESSURE: 101 MMHG | DIASTOLIC BLOOD PRESSURE: 69 MMHG

## 2018-12-06 RX ADMIN — SERTRALINE HYDROCHLORIDE SCH MG: 100 TABLET, FILM COATED ORAL at 08:38

## 2018-12-06 RX ADMIN — NICOTINE SCH PATCH: 21 PATCH TRANSDERMAL at 08:40

## 2018-12-06 RX ADMIN — WATER SCH: 100 INJECTION, SOLUTION INTRAVENOUS at 00:00

## 2018-12-06 RX ADMIN — BUPRENORPHINE HYDROCHLORIDE, NALOXONE HYDROCHLORIDE SCH EACH: 8; 2 FILM, SOLUBLE BUCCAL; SUBLINGUAL at 08:39

## 2018-12-06 RX ADMIN — CEFDINIR SCH MG: 300 CAPSULE ORAL at 02:00

## 2018-12-06 RX ADMIN — THERA TABS SCH TAB: TAB at 08:39

## 2018-12-06 RX ADMIN — RISPERIDONE SCH MG: 1 TABLET ORAL at 08:37

## 2018-12-06 NOTE — DS
Subjective





- Subjective


Service Types: [id number] [facility] complex over 30 min


Discharge Date: 12/06/18


Subjective: 





IDENTIFYING DATA:  Gianna is a 38-year-old   female with at 

least 3 prior psychiatric 


hospitalizations who was brought in to the emergency department by her  

due to experiencing paranoia, 


both auditory or visual hallucinations.





HISTORY OF PRESENT ILLNESS:  The patient has been hearing multiple voices of 

known people who were having 


conversations among themselves and at times seeing people in the field behind 

her home.  However, today during 


the evaluation, she denied experiencing any visual hallucinations, although she 

heard the same voices once or twice 


during the day.  Otherwise, she denies any mood symptoms at this time.  She and 

her  got  for 


approximately a week due to not getting along and fighting in front of the 

kids.  Gianna moved out of the house into her 


mother's and stayed there away from her .  She took her children with 

her.  She also reports that she has 


been eating and sleeping well.  Her  reported that in recent past, Gianna 

scratched herself on the chest with a 


broken butter knife. Gianna reports of having financial, emotional, and work-

related problems.





PAST PSYCHIATRIC HISTORY:  As mentioned in the HPI, this is her 4th psychiatric 

hospitalization.  Last 


hospitalization on this unit was on 01/06/16.  Prior to that, she was 

hospitalized in 2014 and 2012.  Her prior 


hospitalizations were almost under similar circumstances.





PAST MEDICAL HISTORY:  Unremarkable.


 


ALLERGIES:  No known drug allergies.





SUBSTANCE ABUSE HISTORY:  Gianna appears to be minimizing her substance use 

history. Her  urine drug screen 


was positive for cannabinoids as well as benzodiazepines. During the evaluation 

in the emergency room, she 


reported to the ED provider that she overdosed on multiple Suboxone.  Today, 

she reports that she has been 


smoking marijuana at least couple of times daily and she has been doing it for 

years.  In the past, for approximately 


5 or 6 years, she was using heroin and methamphetamine, used IV drugs.  For 

last 3 to 4 years, she has been on 


Suboxone and reports that she had used heroin in between for brief period of 

time.





FAMILY PSYCHIATRIC HISTORY:  Unremarkable.


 


PERSONAL AND SOCIAL HISTORY:  Gianna dropped out of high school, but later 

received GED.  She has been 


living with her  up until a week ago; however, moved out to her mother's 

because of relationship problem.  


She was  to her  in 2013 and has 1 son.  She has 2 other sons 

from prior relationships.


 


PHYSICAL EXAMINATION





GENERAL:  Physical exam was offered, but Gianna declined because in her opinion 

she had a recent physical exam 


in the emergency department.  Review of emergency records indicate that she 

does not have any acute or chronic 


physical health conditions.  At this time, she is not in any acute distress.





VITAL SIGNS:  Show a blood pressure of 122/80, pulse 73, respirations 18, 

temperature 97.4, pulse ox 99% on room 


air.


Gianna is a well-built, average height, moderately obese,  female, who 

is appropriately dressed and neatly 


groomed.





LABORATORY DATA:  Review of labs shows a WBC count of 7.5, hemoglobin 14.1, 

hematocrit 43, platelets 204.  


Chemistry profile shows serum sodium level of 141, potassium 3.3 slightly lower 

than normal range, chloride 104, 


carbon dioxide 28, BUN 11, creatinine 0.79.  Rest of the report appears to be 

unremarkable except for toxicology 


screen shows positive for cannabinoids and benzodiazepines.





MENTAL STATUS EXAM:  Gianna is alert and oriented to time, place, and person.  

Makes good eye contact.  Speech 


is normal in all spheres.  Describes her mood as "fine." Observed affect 

appears to be euthymic.  Her intelligence 


appears to be average as evidenced by her vocabulary and fund of knowledge.  

Memory functions are intact in all 


spheres.  Reports of experiencing auditory hallucinations at this time of 

multiple known voices conversing among 


each other, which is much less than it was the entire last week.  Denies 

experiencing any visual hallucinations.  Her 


thought processes are logical and goal directed.  Denies any paranoia at this 

time, although she reported of having 


paranoid thoughts prior to admission.  Insight and judgement appears to be poor.


 


SUMMARY:  This 38-year-old ,  female, mother of 3 children with 

known history of mood and 


psychotic symptoms in the context of substance use.  She was hospitalized 3 

times prior to this because of same 


reason.





DIAGNOSTIC IMPRESSION ON ADMISSION:  Substance-induced psychosis, rule out 

other psychotic disorders including





DIAGNOSTIC IMPRESSION ON DISCHARGE:  Substance-induced psychosis, Depressive 

Disorder unspecified, Opioid dependence on maintenance treatment 








Objective





- Appearance


Appearance: Healthy Appearing


Dysmorphic Features: No


Hygiene: Normal


Grooming: Fairly Well Kept





- Behavior


Psychomotor Activities: Normal


Exhibits Abnormal Movement: No





- Attitude and Relatedness


Attitude and Relatedness: Cooperative


Eye Contact: Fair





- Speech


Quality: Unpressured


Latencies: Normal


Quantity: Terse





- Mood


Patient's Decription of Mood: "Fine"





- Affect


Observed Affect: Fair


Affect Consistent with: Euthymia





- Thought Process


Patient's Thought Process: Coherent


Thought Content: No Passive Death Wish, No Suicidal Planning, No Homicidal 

Ideation, No Paranoid Ideation





- Sensorium


Experiencing Hallucinations: No, Sensorium is Clear


Type of Hallucinations: Visual: No, Auditory: No, Command: No





- Level of Consciousness


Level of Consciousness: Alert


Orientation: No Intact, No Orientated to Time, No Orientated to Place, No 

Orientated to Person





- Impulse Control


Impulse Control: Intact





- Insight and Judgement


Insight and Judgement: Fair





- Group Participation


Particating in Group Activities: Yes





- Medication Management


Medication Management Adherence: Yes





Treatment Course & Assessment


Clinical Course & Impression: 





This 38-year-old ,  female, mother of 3 children with known 

history of mood and psychotic symptoms in the context of substance use.  She 

was hospitalized 3 times prior to this because of same reason. Patient was 

hospitalized on behavioral science unit for her safety and rapid stabilization 

of psychotic symptoms.  Supportive milieu, individual and group therapy will be 

initiated.  Her code status was full.  She was continued on her outpatient 

medications plus Risperdal 1 mg at [facility] Gianna has been following up with [facility] 

for her Suboxone treatment. Patient  was reviewed and last suboxone dispense 

was on 11/26/18.





Patient reports compliance with treatment, further improvement in her symptoms 

of psychosis of hallucination, no paranoia reported. Patient reported that she 

has not used any benzodiazepine and when educated about her urine toxicology 

reported that her marijuana might have been laced with other substances. 

Patient reported no history of sustained psychotic symptoms in the past. 

Patient mood was less anxious and less dysphoric. Patient has been reporting no 

suicidal or homicidal ideation and willing to work with outpatient substance 

abuse treatment and setting up goals for her self at the house to help abstain 

from substance and prevent relapse. Patient urine culture was positive for 

Group B strep and reports having urinary frequency and some pain pain. Patient'

s medications were adjusted after informed consent with switching of 

Risperidone to Seroquel 25 mg at bedtime to help with sleep as psychotic 

symptoms has nearly resolved with short Risperidone trial. Patient to continue 

with Zoloft 200 mg PO QAM. Continue with Suboxone 8 mg BID for now. Hospitalist 

was called to discuss UTI, patient was started antibiotic therapy of three days.





Patient was in distress after switching to Quetiapine and lower dose to assist 

with sleep.  Patient anxiety reportedly had a relapse and worsening in symptoms 

of psychosis with paranoia and auditory hallucinations. Patient was responding 

to internal stimuli couldn't sleep last night. Patient behavior was 

unpredictable but was safe on all checks. Patient mood was more anxious and 

dysphoric. Patient's Seroquel was discontinued and was started back on 

Risperidone and increased to 0.5 mg PO QAM and 1 mg PO QHS. Patient to continue 

with Zoloft 200 mg PO QAM. Continue with Suboxone 8 mg BID for now. Patient was 

counseled about her substance/cannabis abuse. Patient wanted to wean her self 

slowly from Suboxone and was instructed to work with outpatient provider to 

help adjusting suboxone dose. 





Patient signed 72 hours asking for discharge. Patient improved in her psychotic 

symptoms after switching back to Risperidone. Family meeting was held with her 

partner. Partner agreed with discharge planning as well. Partner will assist 

patient with transition process and medication administration. Patient improved 

and was showing stability in her mood, anxiety and behavior, not suicidal and 

not homicidal. Patient was discussed with team as patient was doing better, was 

not a danger to self and others, did not meet criteria for involuntary 

hospitalization patient was discharged with plan to follow up outpatient 

treatment at [facility] and Substance abuse treatment outpatient. 


[facility]its Inpatient Hospitalization: No


Clear for Discharge: Adequate Clinical Respons, Acceptable Safety Profile, Low 

Utility of Inpt Care


Inpatient DSM-V Dx: F12.151





Discharge Planning





- Discharge Planning


Discharge Plan: Outpatient Follow Up


Outpatient Program: [facility]


Recommendations for Continuing Care: Substance Abuse Counseling


Medications: 





DISCHARGE MEDICATIONS:





Patient was provided 2 weeks of following medications:





Nicotine (Nicotine Patch 21 Mg/24 Hr*)  1 patch TRANSDERM DAILY [facility]


   Last Admin: 12/04/18 08:51 Dose:  1 patch


Risperidone (Risperdal*)  0.5 mg PO DAILY [facility]


   Last Admin: 12/04/18 10:40 Dose:  0.5 mg


Risperidone (Risperdal*)  1 mg PO BEDTIME [facility]


   Last Admin: 12/04/18 20:48 Dose:  1 mg


Sertraline HCl (Zoloft*)  200 mg PO QAM [facility]


   Last Admin: 12/04/18 08:51 Dose:  200 mg





Patient to continue with Suboxone and follow up outpatient but was not provided 

any prescription upon discharge.





Buprenorphine/Naloxone (Suboxone 8 Mg-2 Mg Sl Film)  1 each SL FILM BID [facility]


   Last Admin: 12/04/18 22:45 Dose:  Not Given


Discharge Planning: 


Prescriptions provided for discharge                  [x] Yes   [] No   





Follow up care details as per social work arrangements.


Patient response to discharge plan:   


                                                                 [x] eager for 

discharge


                                    [] agreeable with discharge plan


                                  [] ambivalent about discharge


                                   [] disagrees with discharge today

## 2018-12-25 ENCOUNTER — HOSPITAL ENCOUNTER (INPATIENT)
Dept: HOSPITAL 25 - ED | Age: 38
LOS: 8 days | Discharge: HOME | DRG: 776 | End: 2019-01-02
Attending: PSYCHIATRY & NEUROLOGY | Admitting: PSYCHIATRY & NEUROLOGY
Payer: COMMERCIAL

## 2018-12-25 DIAGNOSIS — F20.9: ICD-10-CM

## 2018-12-25 DIAGNOSIS — R45.851: ICD-10-CM

## 2018-12-25 DIAGNOSIS — Z81.8: ICD-10-CM

## 2018-12-25 DIAGNOSIS — F19.159: Primary | ICD-10-CM

## 2018-12-25 LAB
ALBUMIN SERPL BCG-MCNC: 4.7 G/DL (ref 3.2–5.2)
ALBUMIN/GLOB SERPL: 1.9 {RATIO} (ref 1–3)
ALP SERPL-CCNC: 70 U/L (ref 34–104)
ALT SERPL W P-5'-P-CCNC: 12 U/L (ref 7–52)
ANION GAP SERPL CALC-SCNC: 9 MMOL/L (ref 2–11)
APAP SERPL-MCNC: < 15 MCG/ML
AST SERPL-CCNC: 14 U/L (ref 13–39)
BASOPHILS # BLD AUTO: 0.1 10^3/UL (ref 0–0.2)
BUN SERPL-MCNC: 14 MG/DL (ref 6–24)
BUN/CREAT SERPL: 15.7 (ref 8–20)
CALCIUM SERPL-MCNC: 10.1 MG/DL (ref 8.6–10.3)
CHLORIDE SERPL-SCNC: 104 MMOL/L (ref 101–111)
EOSINOPHIL # BLD AUTO: 0.1 10^3/UL (ref 0–0.6)
GLOBULIN SER CALC-MCNC: 2.5 G/DL (ref 2–4)
GLUCOSE SERPL-MCNC: 115 MG/DL (ref 70–100)
HCG SERPL QL: < 0.6 MIU/ML
HCO3 SERPL-SCNC: 25 MMOL/L (ref 22–32)
HCT VFR BLD AUTO: 42 % (ref 35–47)
HGB BLD-MCNC: 14.3 G/DL (ref 12–16)
LYMPHOCYTES # BLD AUTO: 2.1 10^3/UL (ref 1–4.8)
MCH RBC QN AUTO: 29 PG (ref 27–31)
MCHC RBC AUTO-ENTMCNC: 34 G/DL (ref 31–36)
MCV RBC AUTO: 86 FL (ref 80–97)
MONOCYTES # BLD AUTO: 0.6 10^3/UL (ref 0–0.8)
NEUTROPHILS # BLD AUTO: 5.6 10^3/UL (ref 1.5–7.7)
NRBC # BLD AUTO: 0 10^3/UL
NRBC BLD QL AUTO: 0.1
PLATELET # BLD AUTO: 234 10^3/UL (ref 150–450)
POTASSIUM SERPL-SCNC: 3.9 MMOL/L (ref 3.5–5)
PROT SERPL-MCNC: 7.2 G/DL (ref 6.4–8.9)
RBC # BLD AUTO: 4.89 10^6/UL (ref 4–5.4)
SALICYLATES SERPL-MCNC: < 2.5 MG/DL (ref ?–30)
SODIUM SERPL-SCNC: 138 MMOL/L (ref 135–145)
TSH SERPL-ACNC: 2.87 MCIU/ML (ref 0.34–5.6)
WBC # BLD AUTO: 8.5 10^3/UL (ref 3.5–10.8)

## 2018-12-25 PROCEDURE — 99231 SBSQ HOSP IP/OBS SF/LOW 25: CPT

## 2018-12-25 PROCEDURE — 36415 COLL VENOUS BLD VENIPUNCTURE: CPT

## 2018-12-25 PROCEDURE — 85025 COMPLETE CBC W/AUTO DIFF WBC: CPT

## 2018-12-25 PROCEDURE — 84443 ASSAY THYROID STIM HORMONE: CPT

## 2018-12-25 PROCEDURE — 99284 EMERGENCY DEPT VISIT MOD MDM: CPT

## 2018-12-25 PROCEDURE — 80320 DRUG SCREEN QUANTALCOHOLS: CPT

## 2018-12-25 PROCEDURE — 80329 ANALGESICS NON-OPIOID 1 OR 2: CPT

## 2018-12-25 PROCEDURE — 84702 CHORIONIC GONADOTROPIN TEST: CPT

## 2018-12-25 PROCEDURE — 99222 1ST HOSP IP/OBS MODERATE 55: CPT

## 2018-12-25 PROCEDURE — 80307 DRUG TEST PRSMV CHEM ANLYZR: CPT

## 2018-12-25 PROCEDURE — G0480 DRUG TEST DEF 1-7 CLASSES: HCPCS

## 2018-12-25 PROCEDURE — 80053 COMPREHEN METABOLIC PANEL: CPT

## 2018-12-25 PROCEDURE — 99233 SBSQ HOSP IP/OBS HIGH 50: CPT

## 2018-12-25 PROCEDURE — 99232 SBSQ HOSP IP/OBS MODERATE 35: CPT

## 2018-12-25 NOTE — ED
Psychiatric Complaint





- HPI Summary


HPI Summary: 


This patient is a 38 year old F presenting [facility] a chief complaint of panic 

attack. She states she was at a [place] get together with her family when she 

began hyperventilating and feeling panicked. She denies anything in particular 

causing the panic attack. The patient takes Zoloft for her anxiety. She denies 

SI and HI and ETOH use today. She has a Hx of anxiety, depression, and panic 

disorder.








- History Of Current Complaint


Chief Complaint: [facility]entalHealth


Time Seen by Provider: 12/25/18 19:17


Hx Obtained From: Patient


Onset/Duration: Sudden Onset


Severity Initially: Mild


Severity Currently: Mild


Character: Anxious


Aggravating Factor(s): Nothing


Alleviating Factor(s): Nothing


Associated Signs And Symptoms: Positive: Negative


Related History: Positive For: Prior Psychiatric Issues


Has Suicidal: Denies: Thoughts, With A Plan


Has Homicidal: Denies: Thoughts, With A Plan





- Risk Factor(s)


Completed Suicide Risk Factors: White American





- Allergies/Home Medications


Allergies/Adverse Reactions: 


 Allergies











Allergy/AdvReac Type Severity Reaction Status Date / Time


 


No Known Allergies Allergy   Verified 12/01/18 16:24














PMH/Surg Hx/FS Hx/Imm Hx


Endocrine/Hematology History: 


   Denies: Hx Diabetes, Hx Thyroid Disease


Cardiovascular History: Reports: Other Cardiovascular Problems/Disorders - 

PALPATATIONS


   Denies: Hx Hypertension


Respiratory History: 


   Denies: Hx Asthma, Hx Chronic Obstructive Pulmonary Disease (COPD)


GI History: 


   Denies: Hx Ulcer


 History: Reports: Hx Kidney Stones, Hx Renal Disease - Kidney stones


Sensory History: Reports: Hx Eye Injury - Bilateral Benign Growths


   Denies: Hx Contacts or Glasses, Hx Hearing Aid


Opthamlomology History: Reports: Hx Eye Injury - Bilateral Benign Growths


   Denies: Hx Contacts or Glasses


Psychiatric History: Reports: Hx Anxiety, Hx Attention Deficit Hyperactivity 

Disorder, Hx Depression, Hx Panic Disorder, Hx Inpatient Treatment, Hx 

Community Mental Health Tx, Hx Schizophrenia, Hx Substance Abuse


   Denies: Hx Eating Disorder, Hx of Violent Episodes Against Others





- Surgical History


Surgery Procedure, Year, and Place: Kidney Stones





- Immunization History


Date of Tetanus Vaccine: not sure


Infectious Disease History: No


Infectious Disease History: 


   Denies: Hx Hepatitis, Hx Human Immunodeficiency Virus (HIV), Traveled 

Outside the [place] in Last 30 Days





- Family History


Known Family History: 


   Negative: Cardiac Disease, Hypertension, Diabetes, Blood Disorder





- Social History


Alcohol Use: None


Hx Substance Use: Yes


Substance Use Type: Reports: Heroin, Marijuana


Substance Use Comment - Amount & Last Used: Heroin (almost 3 years sober)  

marijuana (daily on a good day)


Hx Tobacco Use: Yes


Smoking Status (MU): Heavy Every Day Tobacco Smoker


Type: Cigarettes





Review of Systems


Negative: Fever


Positive: Anxious


All Other Systems Reviewed And Are Negative: Yes





Physical Exam





- Summary


Physical Exam Summary: 





VITAL SIGNS: Reviewed.


GENERAL:  Patient is a well-developed and nourished FEMALE who is lying 

comfortable in the stretcher. Patient is not in any acute respiratory distress.


HEAD AND FACE: No signs of trauma. No ecchymosis, hematomas or skull 

depressions. No sinus tenderness.


EYES: PERRLA, EOMI x 2, No injected conjunctiva, no nystagmus.


EARS: Hearing grossly intact. Ear canals and tympanic membranes are within 

normal limits.


MOUTH: Oropharynx within normal limits.


NECK: Supple, trachea is midline, no adenopathy, no JVD, no carotid bruit, no c-

spine tenderness, neck with full ROM.


CHEST: Symmetric, no tenderness at palpation


LUNGS: Clear to auscultation bilaterally. No wheezing or crackles.


CVS: Regular rate and rhythm, S1 and S2 present, no murmurs or gallops 

appreciated.


ABDOMEN: Soft, non-tender. No signs of distention. No rebound no guarding, and 

no masses palpated. Bowel sounds are normal.


EXTREMITIES: FROM in all major joints, no edema, no cyanosis or clubbing.


NEURO: Alert and oriented x 3. No acute neurological deficits. Speech is normal 

and follows commands.


SKIN: Dry and warm


PSYCH: Cooperative. Denies SI and HI. 





Triage Information Reviewed: Yes


Vital Signs On Initial Exam: 


 Initial Vitals











Temp Pulse Resp BP Pulse Ox


 


 98.4 F   94   16   126/87   97 


 


 12/25/18 19:13  12/25/18 19:13  12/25/18 19:13  12/25/18 19:13  12/25/18 19:13











Vital Signs Reviewed: Yes





Diagnostics





- Vital Signs


 Vital Signs











  Temp Pulse Resp BP Pulse Ox


 


 12/25/18 19:13  98.4 F  94  16  126/87  97














- Laboratory


Result Diagrams: 


 12/25/18 19:37





 12/25/18 19:37


Lab Statement: Any lab studies that have been ordered have been reviewed, and 

results considered in the medical decision making process.





Course/Dx





- Course


Course Of Treatment: This patient is a 38 year old F presenting [facility] 

chief complaint of panic attack. Patient medically cleared at 2156 awaiting E 

evaluation. E decided to involunarily admit the patient for psychosis, per 

Dr. Mckoy.





- Differential Dx/Clinical Impression


Differential Diagnosis/HQI/PQRI: Positive: Acute Psychosis


Provider Diagnosis: 


 Psychosis








Discharge





- Sign-Out/Discharge


Documenting (check all that apply): Patient Departure





- Discharge Plan


Condition: Stable


Disposition: ADMITTED TO [facility]


Referrals: 


No Primary Care Phys,NOPCP [Primary Care Provider] - 





- Billing Disposition and Condition


Condition: STABLE


Disposition: Admitted to [facility]





- Attestation Statements


Document Initiated by Sergei: Yes


Documenting Scribe: Henri Bobby


Provider For Whom Sergei is Documenting (Include Credential): Malgorzata Mccarthy MD


Scribe Attestation: 


Henri MCCORMICK scribed for Malgorzata Mccarthy MD on 12/26/18 at 0606. 


Scribe Documentation Reviewed: Yes


Provider Attestation: 


The documentation as recorded by the Henri mariscal accurately 

reflects the service I personally performed and the decisions made by Malgorzata frye MD


Status of Scribe Document: Viewed

## 2018-12-26 RX ADMIN — NICOTINE PRN MG: 4 INHALANT RESPIRATORY (INHALATION) at 12:58

## 2018-12-26 RX ADMIN — GLYCERIN PRN DROP: .002; .002; .01 SOLUTION/ DROPS OPHTHALMIC at 13:46

## 2018-12-26 RX ADMIN — SERTRALINE HYDROCHLORIDE SCH MG: 100 TABLET, FILM COATED ORAL at 09:47

## 2018-12-26 RX ADMIN — GLYCERIN PRN DROP: .002; .002; .01 SOLUTION/ DROPS OPHTHALMIC at 18:25

## 2018-12-26 RX ADMIN — THERA TABS SCH TAB: TAB at 09:47

## 2018-12-27 RX ADMIN — SERTRALINE HYDROCHLORIDE SCH MG: 100 TABLET, FILM COATED ORAL at 09:00

## 2018-12-27 RX ADMIN — RISPERIDONE SCH MG: 1 TABLET ORAL at 20:59

## 2018-12-27 RX ADMIN — NICOTINE SCH PATCH: 21 PATCH TRANSDERMAL at 15:52

## 2018-12-27 RX ADMIN — THERA TABS SCH TAB: TAB at 09:00

## 2018-12-27 NOTE — PN
Subjective





- Subjective


Date of Service: 12/27/18


Service Type: [id number] Hosp care 15 min low complexity


Subjective: 





Gianna was pleasant, but superficial today. She agreed to take 1 mg of Risperdal 

and 100 mg of Zoloft. She also requested Suboxone. Although she has been 

prescribed Suboxone that should have ended on the 17th of December and her 

urine drug screen was negative for opiates (which does not preclude her having 

taken Suboxone), she has a long history of substance use. The decision to give 

her Suboxone while inpatient is informed by our need to have her take an 

antipsychotic. When she is less psychotic she may be more able to make 

appropriate choices. Further, the focus of her stay here is her psychiatric 

stability rather than her potential withdrawal from opiates.





Objective





- Appearance


Appearance: Healthy Appearing


Dysmorphic Features: No


Hygiene: Normal


Grooming: Fairly Well Kept





- Behavior


Psychomotor Activities: Normal


Exhibits Abnormal Movement: No





- Attitude and Relatedness


Attitude and Relatedness: Superficially Cooperative


Eye Contact: Good





- Speech


Quality: Unpressured


Latencies: Short


Quantity: Terse





- Mood


Patient's Decription of Mood: "Fine"





- Affect


Observed Affect: Constricted


Affect Consistent with: Euthymia





- Thought Process


Patient's Thought Process: Coherent


Thought Content: Yes Paranoid Ideation, No Passive Death Wish, No Suicidal 

Planning, No Homicidal Ideation





- Sensorium


Experiencing Hallucinations: No, Sensorium is Clear


Type of Hallucinations: Visual: No, Auditory: No, Command: No





- Level of Consciousness


Level of Consciousness: Alert


Orientation: Yes Intact, Yes Orientated to Time, Yes Orientated to Place, Yes 

Orientated to Person





- Impulse Control


Impulse Control: Intact





- Insight and Judgement


Insight and Judgement: Poor





- Group Participation


Particating in Group Activities: No





- Medication Management


Medication Management Adherence: Yes





Assessment





- Assessment


Merits Inpatient Hospitalization: For Immediate Safety, For Stabilization


Inpatient DSM-V Dx: F20.9





Plan





- Plan


Treatment Plan: 


Name: GIANNA RUGGIERO                        


YOB: 1980                        


[id number]


[id number]











Continued Medication Management: Different Medication


Medications: 


 Current Medications





Acetaminophen (Tylenol Tab*)  650 mg PO Q4H PRN


   PRN Reason: PAIN or TEMP > 101 F


Al Hydrox/Mg Hydrox/Simethicone (Maalox Plus*)  30 ml PO Q4H PRN


   PRN Reason: INDIGESTION


Buprenorphine/Naloxone (Suboxone 8 Mg-2 Mg Sl Film)  1 each SL FILM DAILY [facility]


Device (Nicotine Mouth Piece*)  1 each INH .CARTRIDGE [facility]


   Last Admin: 12/26/18 12:58 Dose:  1 each


Guaifenesin (Robitussin*)  5 ml PO Q4H PRN


   PRN Reason: COUGH


Hydroxyzine HCl (Atarax Tab*)  50 mg PO Q4H PRN


   PRN Reason: ANXIETY


Multivitamins (Theragran Tab*)  1 tab PO DAILY [facility]


   Last Admin: 12/27/18 09:00 Dose:  1 tab


Nicotine (Nicotine Inhaler*)  10 mg INH Q2H PRN


   PRN Reason: CRAVING


   Last Admin: 12/26/18 12:58 Dose:  10 mg


Nicotine Polacrilex (Nicotine Gum*)  2 mg PO Q2H PRN


   PRN Reason: CRAVING


Polyvinyl Alcohol (Polyvinyl Alcohol 1.4% Opth*)  1 drop BOTH EYES Q2H PRN


   PRN Reason: DRY EYE


   Last Admin: 12/26/18 18:25 Dose:  1 drop


Risperidone (Risperdal*)  1 mg PO BEDTIME [facility]


Sertraline HCl (Zoloft*)  100 mg PO DAILY [facility]











- Discharge Plan


Discharge Plan: Outpatient Follow Up

## 2018-12-28 RX ADMIN — BUPRENORPHINE HYDROCHLORIDE, NALOXONE HYDROCHLORIDE SCH EACH: 8; 2 FILM, SOLUBLE BUCCAL; SUBLINGUAL at 08:31

## 2018-12-28 RX ADMIN — GLYCERIN PRN DROP: .002; .002; .01 SOLUTION/ DROPS OPHTHALMIC at 09:27

## 2018-12-28 RX ADMIN — THERA TABS SCH TAB: TAB at 08:31

## 2018-12-28 RX ADMIN — NICOTINE PRN MG: 4 INHALANT RESPIRATORY (INHALATION) at 20:55

## 2018-12-28 RX ADMIN — RISPERIDONE SCH MG: 1 TABLET ORAL at 20:41

## 2018-12-28 RX ADMIN — SERTRALINE HYDROCHLORIDE SCH MG: 100 TABLET, FILM COATED ORAL at 08:31

## 2018-12-28 RX ADMIN — NICOTINE PRN MG: 4 INHALANT RESPIRATORY (INHALATION) at 06:05

## 2018-12-28 RX ADMIN — ACETAMINOPHEN PRN MG: 325 TABLET ORAL at 18:59

## 2018-12-28 RX ADMIN — NICOTINE SCH PATCH: 21 PATCH TRANSDERMAL at 09:25

## 2018-12-28 RX ADMIN — NICOTINE PRN MG: 4 INHALANT RESPIRATORY (INHALATION) at 17:03

## 2018-12-28 RX ADMIN — GLYCERIN PRN DROP: .002; .002; .01 SOLUTION/ DROPS OPHTHALMIC at 16:48

## 2018-12-28 NOTE — PN
Subjective





- Subjective


Date of Service: 12/28/18


Service Type: 80404 Hosp care 25 min moderate complexity


Subjective: 





Gianna reports feeling well and happy. She also indicates that she is not having 

psychotic thoughts at all. She feels like she is entirely better.We discuss 

with her possibilities of other medications, including Invega, as well as Consta

, Sustenna, Nikki, and Maintena. She is not interested in an injectable, however

, stating she is a former addict and needles would be triggering for her. 

Nevertheless, the conversation was started and she was given adequate 

information to make an informed decision. She states she feels like Risperdal 

works for her and she is satisfied with that. Further, the 100 mg of Zoloft 

that she requested is helping her feel more relaxed. She advocates for a "just 

in case" medication, specifically Xanax, which I do not prescribe. She does not 

accept that it is an inappropriate medication for her. She also does not 

acknowledge that she is positive for benzodiazepines, amphetamines, and 

cannabis.





Objective





- Appearance


Appearance: Healthy Appearing


Dysmorphic Features: No


Hygiene: Normal


Grooming: Well Kept





- Behavior


Psychomotor Activities: Normal


Exhibits Abnormal Movement: No





- Attitude and Relatedness


Attitude and Relatedness: Superficially Cooperative


Eye Contact: Fair





- Speech


Quality: Unpressured


Latencies: Normal


Quantity: Appropriate





- Mood


Patient's Decription of Mood: "Fine"





- Affect


Observed Affect: Unvariable


Affect Consistent with: Euthymia





- Thought Process


Patient's Thought Process: Coherent, Goal Directed


Thought Content: Yes Paranoid Ideation, No Passive Death Wish, No Suicidal 

Planning, No Homicidal Ideation





- Sensorium


Experiencing Hallucinations: No, Sensorium is Clear


Type of Hallucinations: Visual: No, Auditory: No, Command: No





- Level of Consciousness


Level of Consciousness: Alert


Orientation: Yes Intact, Yes Orientated to Time, Yes Orientated to Place, Yes 

Orientated to Person





- Impulse Control


Impulse Control: Tenuous





- Insight and Judgement


Insight and Judgement: Fair





- Group Participation


Particating in Group Activities: Yes





- Medication Management


Medication Management Adherence: Yes





Assessment





- Assessment


Merits Inpatient Hospitalization: For Immediate Safety


Inpatient DSM-V Dx: F20.9


Clinical Impression: 





Gianna is a 38-y.o. woman who is a polysubstance user who has become paranoid 

and psychotic with ideas of reference, likely in the wake of using a substance 

like meth, but she is quite adept at covering up these symptoms. She was 

brought to the hospital by family for a "panic attack" although collateral 

indicates she has been behaving strangely for some time.





Plan





- Plan


Treatment Plan: 


Name: GIANNA RUGGIERO                        


YOB: 1980                        


[id number]


[id number]











Continued Medication Management: Continue Outpt Medication


Medications: 


 Current Medications





Acetaminophen (Tylenol Tab*)  650 mg PO Q4H PRN


   PRN Reason: PAIN or TEMP > 101 F


Al Hydrox/Mg Hydrox/Simethicone (Maalox Plus*)  30 ml PO Q4H PRN


   PRN Reason: INDIGESTION


Buprenorphine/Naloxone (Suboxone 8 Mg-2 Mg Sl Film)  1 each SL FILM DAILY [facility]


   Last Admin: 12/28/18 08:31 Dose:  1 each


Device (Nicotine Mouth Piece*)  1 each INH .CARTRIDGE [facility]


   Last Admin: 12/26/18 12:58 Dose:  1 each


Guaifenesin (Robitussin*)  5 ml PO Q4H PRN


   PRN Reason: COUGH


Hydroxyzine HCl (Atarax Tab*)  50 mg PO Q4H PRN


   PRN Reason: ANXIETY


Multivitamins (Theragran Tab*)  1 tab PO DAILY [facility]


   Last Admin: 12/28/18 08:31 Dose:  1 tab


Nicotine (Nicotine Inhaler*)  10 mg INH Q2H PRN


   PRN Reason: CRAVING


   Last Admin: 12/28/18 06:05 Dose:  10 mg


Nicotine (Nicotine Patch 21 Mg/24 Hr*)  1 patch TRANSDERM DAILY@0800 [facility]


   Last Admin: 12/28/18 09:25 Dose:  1 patch


Nicotine Polacrilex (Nicotine Gum*)  2 mg PO Q2H PRN


   PRN Reason: CRAVING


   Last Admin: 12/27/18 15:27 Dose:  2 mg


Pharmacy Profile Note (Nicotine Patch Removal Note*)  1 note PATCH OFF 0800 [facility]


Polyvinyl Alcohol (Polyvinyl Alcohol 1.4% Opth*)  1 drop BOTH EYES Q2H PRN


   PRN Reason: DRY EYE


   Last Admin: 12/28/18 09:27 Dose:  1 drop


Risperidone (Risperdal*)  1 mg PO BEDTIME [facility]


   Last Admin: 12/27/18 20:59 Dose:  1 mg


Sertraline HCl (Zoloft*)  100 mg PO DAILY [facility]


   Last Admin: 12/28/18 08:31 Dose:  100 mg











- Discharge Plan


Discharge Plan: Outpatient Follow Up


Additional Comments: 





Gianna's medications will be restarted. Risperdal 1 mg, Zoloft 100 mg, Suboxone 8

/2 daily, and a multivitamin, at her request. The Suboxone is being restarted 

inpatient to reduce the changes of opiate withdrawal, as it is likely that she 

has been taking Suboxone outpatient. It will not be prescribed in the 

outpatient setting. That will be discussed with her outpatient provider, as had 

been discussed with Dr. Dietrich at last discharge.

## 2018-12-29 RX ADMIN — NICOTINE PRN MG: 4 INHALANT RESPIRATORY (INHALATION) at 12:19

## 2018-12-29 RX ADMIN — BUPRENORPHINE HYDROCHLORIDE, NALOXONE HYDROCHLORIDE SCH EACH: 8; 2 FILM, SOLUBLE BUCCAL; SUBLINGUAL at 08:30

## 2018-12-29 RX ADMIN — GLYCERIN PRN DROP: .002; .002; .01 SOLUTION/ DROPS OPHTHALMIC at 18:30

## 2018-12-29 RX ADMIN — NICOTINE SCH PATCH: 21 PATCH TRANSDERMAL at 08:28

## 2018-12-29 RX ADMIN — NICOTINE PRN MG: 4 INHALANT RESPIRATORY (INHALATION) at 18:30

## 2018-12-29 RX ADMIN — WATER SCH NOTE: 100 INJECTION, SOLUTION INTRAVENOUS at 08:28

## 2018-12-29 RX ADMIN — NICOTINE PRN MG: 4 INHALANT RESPIRATORY (INHALATION) at 20:39

## 2018-12-29 RX ADMIN — SERTRALINE HYDROCHLORIDE SCH MG: 100 TABLET, FILM COATED ORAL at 08:30

## 2018-12-29 RX ADMIN — RISPERIDONE SCH MG: 1 TABLET ORAL at 20:38

## 2018-12-29 RX ADMIN — THERA TABS SCH TAB: TAB at 08:30

## 2018-12-29 RX ADMIN — NICOTINE PRN MG: 4 INHALANT RESPIRATORY (INHALATION) at 08:32

## 2018-12-29 RX ADMIN — NICOTINE PRN MG: 4 INHALANT RESPIRATORY (INHALATION) at 14:26

## 2018-12-29 RX ADMIN — GLYCERIN PRN DROP: .002; .002; .01 SOLUTION/ DROPS OPHTHALMIC at 10:54

## 2018-12-30 RX ADMIN — NICOTINE PRN MG: 4 INHALANT RESPIRATORY (INHALATION) at 08:18

## 2018-12-30 RX ADMIN — NICOTINE PRN MG: 4 INHALANT RESPIRATORY (INHALATION) at 10:43

## 2018-12-30 RX ADMIN — THERA TABS SCH TAB: TAB at 08:19

## 2018-12-30 RX ADMIN — NICOTINE PRN MG: 4 INHALANT RESPIRATORY (INHALATION) at 15:01

## 2018-12-30 RX ADMIN — RISPERIDONE SCH MG: 1 TABLET ORAL at 21:34

## 2018-12-30 RX ADMIN — NICOTINE PRN MG: 4 INHALANT RESPIRATORY (INHALATION) at 17:33

## 2018-12-30 RX ADMIN — NICOTINE PRN MG: 4 INHALANT RESPIRATORY (INHALATION) at 21:35

## 2018-12-30 RX ADMIN — BUPRENORPHINE HYDROCHLORIDE, NALOXONE HYDROCHLORIDE SCH EACH: 8; 2 FILM, SOLUBLE BUCCAL; SUBLINGUAL at 08:19

## 2018-12-30 RX ADMIN — WATER SCH: 100 INJECTION, SOLUTION INTRAVENOUS at 10:44

## 2018-12-30 RX ADMIN — SERTRALINE HYDROCHLORIDE SCH MG: 100 TABLET, FILM COATED ORAL at 08:19

## 2018-12-30 RX ADMIN — NICOTINE SCH PATCH: 21 PATCH TRANSDERMAL at 10:45

## 2018-12-30 NOTE — PN
Subjective





- Subjective


Date of Service: 12/30/18


Service Type: 56433 Hosp care 15 min low complexity


Subjective: 





Gianna reports that she has been doing very well without any psychosis and 

hoping to go home soon. On the unit she paces constantly stating that she likes 

exercise to stay healthy. Hasn't been active with unit routines and stayed away 

from peers.





Objective





- Appearance


Appearance: Healthy Appearing


Dysmorphic Features: No


Hygiene: Normal


Grooming: Well Kept





- Behavior


Psychomotor Activities: Abnormal-Increased


Exhibits Abnormal Movement: No





- Attitude and Relatedness


Attitude and Relatedness: Appropriate


Eye Contact: Good





- Speech


Quality: Unpressured


Latencies: Normal


Quantity: Appropriate





- Mood


Patient's Decription of Mood: "Okay"





- Affect


Observed Affect: Non-labile


Affect Consistent with: Euthymia





- Thought Process


Patient's Thought Process: Coherent, Goal Directed


Thought Content: No Passive Death Wish, No Suicidal Planning, No Homicidal 

Ideation, No Paranoid Ideation





- Sensorium


Experiencing Hallucinations: No, Sensorium is Clear


Type of Hallucinations: Visual: No, Auditory: No, Command: No





- Level of Consciousness


Level of Consciousness: Alert


Orientation: Yes Intact, Yes Orientated to Time, Yes Orientated to Place, Yes 

Orientated to Person





- Impulse Control


Impulse Control: Intact





- Insight and Judgement


Insight and Judgement: Poor





- Group Participation


Particating in Group Activities: No





- Medication Management


Medication Management Adherence: Yes





Assessment





- Assessment


Merits Inpatient Hospitalization: For Immediate Safety, For Stabilization, For 

Discharge Planning


Inpatient DSM-V Dx: F20.9


Clinical Impression: 





Gianna is a 38-y.o. woman who is a polysubstance user who has become paranoid 

and psychotic with ideas of reference, likely in the wake of using a substance 

like meth, but she is quite adept at covering up these symptoms. She was 

brought to the hospital by family for a "panic attack" although collateral 

indicates she has been behaving strangely for some time.





Plan





- Plan


Treatment Plan: 


Name: GIANNA RUGGIERO                        


YOB: 1980                        


[id number]


[id number]











Continued Medication Management: Continue Outpt Medication


Medications: 


 Current Medications





Acetaminophen (Tylenol Tab*)  650 mg PO Q4H PRN


   PRN Reason: PAIN or TEMP > 101 F


   Last Admin: 12/28/18 18:59 Dose:  650 mg


Al Hydrox/Mg Hydrox/Simethicone (Maalox Plus*)  30 ml PO Q4H PRN


   PRN Reason: INDIGESTION


Buprenorphine/Naloxone (Suboxone 8 Mg-2 Mg Sl Film)  1 each SL FILM DAILY [facility]


   Last Admin: 12/30/18 08:19 Dose:  1 each


Device (Nicotine Mouth Piece*)  1 each INH .CARTRIDGE [facility]


   Last Admin: 12/26/18 12:58 Dose:  1 each


Guaifenesin (Robitussin*)  5 ml PO Q4H PRN


   PRN Reason: COUGH


Hydroxyzine HCl (Atarax Tab*)  50 mg PO Q4H PRN


   PRN Reason: ANXIETY


Multivitamins (Theragran Tab*)  1 tab PO DAILY [facility]


   Last Admin: 12/30/18 08:19 Dose:  1 tab


Nicotine (Nicotine Inhaler*)  10 mg INH Q2H PRN


   PRN Reason: CRAVING


   Last Admin: 12/30/18 17:33 Dose:  10 mg


Nicotine (Nicotine Patch 21 Mg/24 Hr*)  1 patch TRANSDERM DAILY@0800 [facility]


   Last Admin: 12/30/18 10:45 Dose:  1 patch


Nicotine Polacrilex (Nicotine Gum*)  2 mg PO Q2H PRN


   PRN Reason: CRAVING


   Last Admin: 12/27/18 15:27 Dose:  2 mg


Pharmacy Profile Note (Nicotine Patch Removal Note*)  1 note PATCH OFF 0800 [facility]


   Last Admin: 12/30/18 10:44 Dose:  Not Given


Polyvinyl Alcohol (Polyvinyl Alcohol 1.4% Opth*)  1 drop BOTH EYES Q2H PRN


   PRN Reason: DRY EYE


   Last Admin: 12/29/18 18:30 Dose:  1 drop


Risperidone (Risperdal*)  1 mg PO BEDTIME [facility]


   Last Admin: 12/29/18 20:38 Dose:  1 mg


Sertraline HCl (Zoloft*)  100 mg PO DAILY [facility]


   Last Admin: 12/30/18 08:19 Dose:  100 mg











- Discharge Plan


Discharge Plan: Outpatient Follow Up


Outpatient Program: [facility]

## 2018-12-31 RX ADMIN — NICOTINE PRN MG: 4 INHALANT RESPIRATORY (INHALATION) at 19:12

## 2018-12-31 RX ADMIN — NICOTINE PRN MG: 4 INHALANT RESPIRATORY (INHALATION) at 06:15

## 2018-12-31 RX ADMIN — NICOTINE SCH PATCH: 21 PATCH TRANSDERMAL at 09:02

## 2018-12-31 RX ADMIN — THERA TABS SCH TAB: TAB at 09:03

## 2018-12-31 RX ADMIN — NICOTINE PRN MG: 4 INHALANT RESPIRATORY (INHALATION) at 15:54

## 2018-12-31 RX ADMIN — SERTRALINE HYDROCHLORIDE SCH MG: 100 TABLET, FILM COATED ORAL at 09:03

## 2018-12-31 RX ADMIN — GLYCERIN PRN DROP: .002; .002; .01 SOLUTION/ DROPS OPHTHALMIC at 19:23

## 2018-12-31 RX ADMIN — RISPERIDONE SCH MG: 1 TABLET ORAL at 21:38

## 2018-12-31 RX ADMIN — NICOTINE PRN MG: 4 INHALANT RESPIRATORY (INHALATION) at 11:58

## 2018-12-31 RX ADMIN — WATER SCH NOTE: 100 INJECTION, SOLUTION INTRAVENOUS at 09:02

## 2018-12-31 RX ADMIN — BUPRENORPHINE HYDROCHLORIDE, NALOXONE HYDROCHLORIDE SCH EACH: 8; 2 FILM, SOLUBLE BUCCAL; SUBLINGUAL at 09:02

## 2018-12-31 RX ADMIN — ACETAMINOPHEN PRN MG: 325 TABLET ORAL at 15:53

## 2018-12-31 NOTE — PN
Subjective





- Subjective


Date of Service: 12/31/18


Service Type: 91623 Hosp care 35 min high complexity


Subjective: 





Gianna became agitated when informed that I needed to assess her progress and 

safety for discharge today.  She reported that she had expected to be 

discharged today, and that she was angry that I might decide that she was not 

ready for discharge.  I attempted to review with her the reasons given for her 

admission in the HPI from Ms Meyer.  Assessment was difficult as she would 

not allow me to complete sentences, and would answer questions she had 

anticipated I was asking instead of waiting to hear complete questions.  





Assessment was extended into an impromptu meeting with her  Yfn, who 

had come to the unit thinking she would be discharged despite this writer 

having asked the patient to inform him that this would not be likely to occur 

today.  This meeting did not go well.  Gianna continued to complain that I 

should not go against her treatment team in not authorizing her discharge, and 

it continued to be difficult to communicate due to her interruptions.  Her 

 became angry at the end of the meeting, calling this provider a "faggot

" and saying I might die, then stepping back his statement in a disingenuous way

, talking about the icy sidewalks out front and the possibility of anyone, 

himself included, slipping and hitting their head and dying.  He also said he 

would bring his wife's continued admission to the attention of the  

General's offices. The assistance I had hoped for from her  did not 

materialize from our meeting, and worse, he told the patient not to take any 

medications as he left the unit.  Question has been raised as to whether he 

should be permitted back on the unit.





By the end of the day of working with her, I had gathered the following 

information per her report:


1.  She took her daughter out for an extended drive prior to hospitalization, 

and police became involved due to concerns raised.  She denies any recall of 

having said her daughter's father had been murdered.


2. She had shown a pen knife and had stated that she might need it for self-

defense.  She was angry at my partial recall of her report as having said she 

needed a knife to defend herself.


3. She believes that any conversation on the unit can be retrieved from the 

cameras everywhere that record all that happens on the unit, and requested that 

we review such footage to demonstrate she was correct in her allegations that I 

was lying in my reports to her and staff.


4. She had done well in the past on a 3 mg dose of Risperdal when under the 

care of Dr Shepherd, to whose care she would like to return as a patient at 

[facility].


5.  She would like to become pregnant, and is aware that psychiatric 

medications pose risks to fetal development.


6.  She and her  attribute her relapse to her having been referred to 

[facility] rather than [facility] following her last admission.





Objective





- Appearance


Appearance: Well Developed/Nourished


Dysmorphic Features: No


Hygiene: Normal


Grooming: Fairly Well Kept





- Behavior


Psychomotor Activities: Abnormal-Increased - in response to frustration at not 

being discharged today


Exhibits Abnormal Movement: No





- Attitude and Relatedness


Attitude and Relatedness: Hostile


Eye Contact: Good





- Speech


Quality: Pressured


Latencies: Short


Quantity: Appropriate





- Mood


Patient's Decription of Mood: "Angry"





- Affect


Observed Affect: Labile


Affect Consistent with: Dysphoria





- Thought Process


Patient's Thought Process: Coherent, Goal Directed - more forthcoming in family 

meeting at times, guarded meeting 1:1


Thought Content: Yes Paranoid Ideation - reports cameras throughout unit record 

all conversations, No Passive Death Wish, No Suicidal Planning, No Homicidal 

Ideation





- Sensorium


Experiencing Hallucinations: No, Sensorium is Clear





- Level of Consciousness


Level of Consciousness: Agitated


Orientation: Yes Intact, Yes Orientated to Time, Yes Orientated to Place, Yes 

Orientated to Person





- Impulse Control


Impulse Control: Tenuous





- Insight and Judgement


Insight and Judgement: Poor





- Group Participation


Particating in Group Activities: Yes





- Medication Management


Medication Management Adherence: Yes





Assessment





- Assessment


Merits Inpatient Hospitalization: For Immediate Safety, For Stabilization, For 

Ongoing Evaluation, For Discharge Planning, Pending Safe DC Plan


Inpatient DSM-V Dx: F20.9


Clinical Impression: 





Gianna is a 38-y.o. woman who is a polysubstance user who has become paranoid 

and psychotic with ideas of reference, likely in the wake of using a substance 

like meth, but she is quite adept at covering up these symptoms. She was 

brought to the hospital by family for a "panic attack" although collateral 

indicates she has been behaving strangely for some time.





On evaluation for potential discharge home today, I was unable to elicit 

responses to questions about specific psychotic symptoms and safety concerns 

raised upon admission that would reassure me that she and her young children 

would be safe if she were to discharge home today, New Year's Nila.  For example

, she at first told me she had no recall of having stated that her daughter's 

father had been murdered, then later in the family meeting accused me of lying 

at that time that I had asked her about this earlier, then finally offered more 

information about the situation.  My concern, raised by Ms Meyer via 

information received from her mother, is that Ms Ruggiero is not giving honest 

report of her internal experience adequate to make a reliable assessment of her 

current mental status.  I am also concerned that Ms Ruggiero may be minimizing her 

recent history of substance use, and will be at increased risk of harm to self 

and others due to rapidly renewed psychosis upon discharge today.  I tried to 

make the case to her that if in the past she has done well on 3 mg of 

risperidone, then it would be better if she were to have an increased dose of 

Risperdal or oral Invega prior to discharge.  She denies that she had refused 

increased doses of medication, although Ms Meyer informed me she had refused 

even increased PO Risperdal.  She did report aversion to needles as reason not 

to take BUCK meds, as I had been informed by Ms Meyer.





Plan





- Plan


Treatment Plan: 


Name: GIANNA RUGGIERO                        


YOB: 1980                        


[id number]


[id number]








Continue current medications.  Had attempted but failed to elicit agreement to 

higher and therefore likely more effective Risperdal dose.  If as a consequence 

of staying 2 more days, Ms Ruggiero is more forthcoming in her report of current 

mental status and reckons more fully with the reasons that led to her admission

, then a safe discharge mignt be possible, but per the information I gathered 

today, I could not determine that she had in fact improved sufficiently to 

likely do well after discharge today.


Medications: 


 Current Medications





Acetaminophen (Tylenol Tab*)  650 mg PO Q4H PRN


   PRN Reason: PAIN or TEMP > 101 F


   Last Admin: 12/28/18 18:59 Dose:  650 mg


Al Hydrox/Mg Hydrox/Simethicone (Maalox Plus*)  30 ml PO Q4H PRN


   PRN Reason: INDIGESTION


Buprenorphine/Naloxone (Suboxone 8 Mg-2 Mg Sl Film)  1 each SL [facility]M [facility]ILY [facility]


   Last Admin: 12/31/18 09:02 Dose:  1 each


Device (Nicotine Mouth Piece*)  1 each INH .[facility]TRIDGE [facility]


   Last Admin: 12/26/18 12:58 Dose:  1 each


Guaifenesin (Robitussin*)  5 ml PO Q4H PRN


   PRN Reason: COUGH


Hydroxyzine HCl (Atarax Tab*)  50 mg PO Q4H PRN


   PRN Reason: ANXIETY


Multivitamins (Theragran Tab*)  1 tab PO DAILY [facility]


   Last Admin: 12/31/18 09:03 Dose:  1 tab


Nicotine (Nicotine Inhaler*)  10 mg INH Q2H PRN


   PRN Reason: CRAVING


   Last Admin: 12/31/18 11:58 Dose:  10 mg


Nicotine (Nicotine Patch 21 Mg/24 Hr*)  1 patch TRANSDERM DAILY@0800 [facility]


   Last Admin: 12/31/18 09:02 Dose:  1 patch


Nicotine Polacrilex (Nicotine Gum*)  2 mg PO Q2H PRN


   PRN Reason: CRAVING


   Last Admin: 12/27/18 15:27 Dose:  2 mg


Pharmacy Profile Note (Nicotine Patch Removal Note*)  1 note PATCH OFF 0800 [facility]


   Last Admin: 12/31/18 09:02 Dose:  1 note


Polyvinyl Alcohol (Polyvinyl Alcohol 1.4% Opth*)  1 drop BOTH EYES Q2H PRN


   PRN Reason: DRY EYE


   Last Admin: 12/29/18 18:30 Dose:  1 drop


Risperidone (Risperdal*)  1 mg PO [facility]EDTIME [facility]


   Last Admin: 12/30/18 21:34 Dose:  1 mg


Sertraline HCl (Zoloft*)  100 mg PO DAILY [facility]


   Last Admin: 12/31/18 09:03 Dose:  100 mg











- Discharge Plan


Discharge Plan: Outpatient Follow Up


Outpatient Program: [facility]

## 2019-01-01 RX ADMIN — SERTRALINE HYDROCHLORIDE SCH MG: 100 TABLET, FILM COATED ORAL at 08:00

## 2019-01-01 RX ADMIN — NICOTINE SCH PATCH: 21 PATCH TRANSDERMAL at 11:05

## 2019-01-01 RX ADMIN — RISPERIDONE SCH MG: 1 TABLET ORAL at 20:25

## 2019-01-01 RX ADMIN — NICOTINE PRN MG: 4 INHALANT RESPIRATORY (INHALATION) at 20:28

## 2019-01-01 RX ADMIN — WATER SCH NOTE: 100 INJECTION, SOLUTION INTRAVENOUS at 11:07

## 2019-01-01 RX ADMIN — NICOTINE PRN MG: 4 INHALANT RESPIRATORY (INHALATION) at 08:00

## 2019-01-01 RX ADMIN — BUPRENORPHINE HYDROCHLORIDE, NALOXONE HYDROCHLORIDE SCH EACH: 8; 2 FILM, SOLUBLE BUCCAL; SUBLINGUAL at 08:00

## 2019-01-01 RX ADMIN — NICOTINE PRN MG: 4 INHALANT RESPIRATORY (INHALATION) at 15:56

## 2019-01-01 RX ADMIN — THERA TABS SCH TAB: TAB at 08:00

## 2019-01-01 NOTE — PN
Subjective





- Subjective


Date of Service: 01/01/19


Service Type: [id number] Hosp care 15 min low complexity


Subjective: 





Gianna continues to be restless and paces constantly. Pleasant on approach and 

denies any psychiatric symptoms. Says they subsided because of meds and she is 

convinced that she is ready for discharge. Reports that she had a melt down 

yesterday for not being discharged as she hoped. Otherwise nursing reports are 

fair to good.





Objective





- Appearance


Appearance: Healthy Appearing


Dysmorphic Features: No


Hygiene: Normal


Grooming: Well Kept





- Behavior


Psychomotor Activities: Abnormal-Increased


Exhibits Abnormal Movement: No





- Attitude and Relatedness


Attitude and Relatedness: Cooperative


Eye Contact: Good





- Speech


Quality: Unpressured


Latencies: Normal


Quantity: Appropriate





- Mood


Patient's Decription of Mood: "Fine"





- Affect


Observed Affect: Non-labile





- Thought Process


Patient's Thought Process: Coherent, Goal Directed


Thought Content: No Passive Death Wish, No Suicidal Planning, No Homicidal 

Ideation, No Paranoid Ideation





- Sensorium


Experiencing Hallucinations: No, Sensorium is Clear


Type of Hallucinations: Visual: No, Auditory: No, Command: No





- Level of Consciousness


Level of Consciousness: Alert


Orientation: Yes Intact, Yes Orientated to Time, Yes Orientated to Place, Yes 

Orientated to Person





- Impulse Control


Impulse Control: Tenuous





- Insight and Judgement


Insight and Judgement: Fair





- Group Participation


Particating in Group Activities: No





- Medication Management


Medication Management Adherence: Yes





Assessment





- Assessment


Merits Inpatient Hospitalization: For Stabilization, Pending Safe DC Plan


Inpatient DSM-V Dx: F20.9


Clinical Impression: 





Gianna is a 38-y.o. woman who is a polysubstance user who has become paranoid 

and psychotic with ideas of reference, likely in the wake of using a substance 

like meth, but she is quite adept at covering up these symptoms. She was 

brought to the hospital by family for a "panic attack" although collateral 

indicates she has been behaving strangely for some time.





On evaluation for potential discharge home today, I was unable to elicit 

responses to questions about specific psychotic symptoms and safety concerns 

raised upon admission that would reassure me that she and her young children 

would be safe if she were to discharge home today, New Year's Nila.  For example

, she at first told me she had no recall of having stated that her daughter's 

father had been murdered, then later in the family meeting accused me of lying 

at that time that I had asked her about this earlier, then finally offered more 

information about the situation.  My concern, raised by Ms Meyer via 

information received from her mother, is that Ms Ruggiero is not giving honest 

report of her internal experience adequate to make a reliable assessment of her 

current mental status.  I am also concerned that Ms Ruggiero may be minimizing her 

recent history of substance use, and will be at increased risk of harm to self 

and others due to rapidly renewed psychosis upon discharge today.  I tried to 

make the case to her that if in the past she has done well on 3 mg of 

risperidone, then it would be better if she were to have an increased dose of 

Risperdal or oral Invega prior to discharge.  She denies that she had refused 

increased doses of medication, although Ms Meyer informed me she had refused 

even increased PO Risperdal.  She did report aversion to needles as reason not 

to take BUCK meds, as I had been informed by Ms Meyer.





Plan





- Plan


Treatment Plan: 


Name: GIANNA RUGGIERO                        


YOB: 1980                        


[id number]


[id number]








Continue current medications.  Had attempted but failed to elicit agreement to 

higher and therefore likely more effective Risperdal dose.  If as a consequence 

of staying 2 more days, Ms Ruggiero is more forthcoming in her report of current 

mental status and reckons more fully with the reasons that led to her admission

, then a safe discharge mignt be possible, but per the information I gathered 

today, I could not determine that she had in fact improved sufficiently to 

likely do well after discharge today.


Continued Medication Management: Continue Outpt Medication


Medications: 


 Current Medications





Acetaminophen (Tylenol Tab*)  650 mg PO Q4H PRN


   PRN Reason: PAIN or TEMP > 101 F


   Last Admin: 12/31/18 15:53 Dose:  650 mg


Al Hydrox/Mg Hydrox/Simethicone (Maalox Plus*)  30 ml PO Q4H PRN


   PRN Reason: INDIGESTION


Buprenorphine/Naloxone (Suboxone 8 Mg-2 Mg Sl Film)  1 each SL FILM DAILY [facility]


   Last Admin: 01/01/19 08:00 Dose:  1 each


Device (Nicotine Mouth Piece*)  1 each INH .CARTRIDGE [facility]


   Last Admin: 12/26/18 12:58 Dose:  1 each


Guaifenesin (Robitussin*)  5 ml PO Q4H PRN


   PRN Reason: COUGH


Hydroxyzine HCl (Atarax Tab*)  50 mg PO Q4H PRN


   PRN Reason: ANXIETY


Multivitamins (Theragran Tab*)  1 tab PO DAILY [facility]


   Last Admin: 01/01/19 08:00 Dose:  1 tab


Nicotine (Nicotine Inhaler*)  10 mg INH Q2H PRN


   PRN Reason: CRAVING


   Last Admin: 01/01/19 08:00 Dose:  10 mg


Nicotine (Nicotine Patch 21 Mg/24 Hr*)  1 patch TRANSDERM DAILY@0800 [facility]


   Last Admin: 01/01/19 11:05 Dose:  1 patch


Nicotine Polacrilex (Nicotine Gum*)  2 mg PO Q2H PRN


   PRN Reason: CRAVING


   Last Admin: 12/27/18 15:27 Dose:  2 mg


Pharmacy Profile Note (Nicotine Patch Removal Note*)  1 note PATCH OFF 0800 [facility]


   Last Admin: 01/01/19 11:07 Dose:  1 note


Polyvinyl Alcohol (Polyvinyl Alcohol 1.4% Opth*)  1 drop BOTH EYES Q2H PRN


   PRN Reason: DRY EYE


   Last Admin: 12/31/18 19:23 Dose:  1 drop


Risperidone (Risperdal*)  1 mg PO BEDTIME [facility]


   Last Admin: 12/31/18 21:38 Dose:  1 mg


Sertraline HCl (Zoloft*)  100 mg PO DAILY [facility]


   Last Admin: 01/01/19 08:00 Dose:  100 mg











- Discharge Plan


Discharge Plan: Outpatient Follow Up


Outpatient Program: [facility]

## 2019-01-02 VITALS — SYSTOLIC BLOOD PRESSURE: 113 MMHG | DIASTOLIC BLOOD PRESSURE: 68 MMHG

## 2019-01-02 RX ADMIN — NICOTINE SCH: 21 PATCH TRANSDERMAL at 10:45

## 2019-01-02 RX ADMIN — WATER SCH: 100 INJECTION, SOLUTION INTRAVENOUS at 10:45

## 2019-01-02 RX ADMIN — THERA TABS SCH TAB: TAB at 09:09

## 2019-01-02 RX ADMIN — BUPRENORPHINE HYDROCHLORIDE, NALOXONE HYDROCHLORIDE SCH EACH: 8; 2 FILM, SOLUBLE BUCCAL; SUBLINGUAL at 09:08

## 2019-01-02 RX ADMIN — SERTRALINE HYDROCHLORIDE SCH MG: 100 TABLET, FILM COATED ORAL at 09:08

## 2019-01-02 NOTE — DS
DISCHARGE SUMMARY:

 

DATE OF ADMISSION:  12/26/18

 

DATE OF DISCHARGE:  01/02/19

 

PROVIDER:  Evi Meyer NP in Psychiatry.

 

SUPERVISING PHYSICIAN:  Neo Prabhakar MD * (DICTATED BY EVI MEYER NP
)

 

DIAGNOSES: 

Axis I:  Substance-induced psychosis. 

Axis II:  Deferred.

 

CONDITION AT THE TIME DISCHARGE:  Improved, psychiatrically cleared, stable.  
Gianna did not participate in many groups and was largely seclusive to herself.  
Her  is agreeable to discharge.  She has done well here psychiatrically.
  She tolerated Risperdal well.  She did not agree to an increase in dose.  She 
will be attending [facility].

 

MENTAL STATUS EXAM AT THE TIME OF DISCHARGE:  Gianna is calm, cooperative, and 
makes good eye contact.  She is quite pleased to be being discharged.  Alert 
and oriented x3.  Her grooming is good.  Her speech pace is normal.  Her 
thought processes are logical.  She is not psychotic, not delusional.  She 
denies AH, VH, SI and HI.  Her insight is fair.  Her judgment is fair.  She 
states she is willing to followup. She is urged to seek treatment for alcohol 
and drug problems, but she is currently reluctant to do so.

 

DISCHARGE INSTRUCTIONS TO THE PATIENT: 

A.  Medications:

1.  She is being discharged with hydroxyzine 50 mg q.4 hours p.r.n. anxiety, 
dispense 60.

2.  Risperdal 1 mg at bedtime, dispense 30.

3.  Zoloft 100 mg, which she has at home and Suboxone 8/2.  She is not being 
dispensed any of these as those she needs to get from her outpatient provider.

 

B.  Diet:  Regular.

 

C.  Activities:  As tolerated.  She has declined referral to [place] 
Smokers Quit Line at this time.  If she decides to access this free service in 
the future, she can contact the quit line at [phone].  There are no 
studies pending at the time of discharge.

 

D.  Followup Care:  She has an appointment with [facility] on 01
/04/19 at 9 o'clock.  She has had a referral placed for mobile integration team 
and they will be contacting her.  She is also referred to finding a primary 
care provider, as she currently does not have one.

 

E.  Substance Abuse Followup:  Substance abuse treatment referrals were offered 
and Gianna declined.

 

HOSPITAL COURSE: Part A:  Chief Complaint:  "My family recommended that I come 
in.  I feel like I need to protect myself from what I do not know."  The 
patient is a 38-year-old  white female with history of prior diagnosis 
of schizophrenia, who arrives, brought in by her family's car and is here on a 
9.39 status after she participated in some bizarre behaviors in front of her 
family as well as having a panic attack on [place] Day.

 

Although Gianna has been reported in the past to have been hearing voices,  she 
states she no longer is hearing voices.  She states that she is fine and that 
she is not mentally ill and that she is not psychotic.  She states psychotic 
episodes last for a short period of time, not for 3 years; therefore, she 
cannot be psychotic.  She states the problem is that she is afraid of things 
that she does not understand and that nobody understands.  These statements can 
be clarified by information from admission indicating that she believes that a 
drone is following her for example.  Also, she believes that electric surges 
are being sent through her body.  In addition to that, she pulled a knife out 
of her coat when she was in her car and her family asked what that was for, she 
stated she needed it for protection.  Also, in addition, she took her daughter 
out in the car for a drive or for shopping and told her that her father had 
been murdered.  All this bizarre behavior adds up to psychosis that perhaps 
continued since last admission, but she stopped taking Risperdal that she was 
prescribed stating "Risperdal sedates me and I'm not psychotic."  Apparently, 
she also believes she is pregnant, but the laboratory data indicates that she 
is not.

 

Part B:  Psychiatric treatment was rendered.  The patient was admitted to the 
adult behavioral unit and placed on 15-minute checks for safety.  Gianna was 
seclusive to herself on the unit and did not go to groups.  She tended not to 
interact with her peers.  Medications were restarted including Risperdal, 
hydroxyzine, and Suboxone. Her mother called to offer collateral.  Gianna would 
not sign a release for her mother or for her  stating that she is a 
grown woman and she does not need to have her mother involved with details of 
her life.  She was much clearer at the end of her stay than she was at the 
beginning.  She was making no psychotic statements.  She acknowledged that she 
had a psychotic episode that led to her bizarre behaviors. She acknowledge that 
she needed Risperdal to help herself clear, so that she no longer was 
psychotic.  



She was seen by Dr. Messi Taylor in coverage and he was concerned about her 
behaviors and her inability to take responsibility for some of the things that 
she said and so rather than leaving on Monday, 12/31/18, she stayed until the 01
/02/19 for her safety.  When she was told she was not going to go, she had an 
outburst: she got very angry.  She acknowledges that that was inappropriate and 
that she sometimes has those problems with anger. She has taken courses as has 
her  in reducing anger and learning to take a step back and is learning 
to take a time out.  I asked her about participation in her life by her mother.
  She states that she sees her mother perhaps twice a year and that she has 
gone years without contacting her.  It is not clear to me whether this is 
actually the case and as I cannot phone her mother to ask that question, I 
remain curious about that aspect of her care.  



In the meantime, she has been receptive to taking Risperdal while in the 
hospital.  She was offered injectable medications including the future option 
of Trinza, which she was somewhat interested in but turned us down for any 
injectable possibility at all.  In general, she is much improved.  She is no 
longer psychotic as can be detected at this time.  She is very clear, extremely 
goal directed and her behavior takes into account all of the oddities that she 
dealt with before including acknowledging that she was psychotic. We discussed 
that methamphetamine and any amphetamines are not good for her in particular.  
She stated, "I have an allergy to amphetamines" and rather than discussing this 
further, I agreed that this was an excellent way to look at the problem.  In 
general, she is improved and she is discharged home with her .

 

____________________________________ EVI MEYER, WANG

 

[id number] #: [id number]

MAUREEN

## 2019-02-06 ENCOUNTER — HOSPITAL ENCOUNTER (EMERGENCY)
Dept: HOSPITAL 25 - ED | Age: 39
Discharge: HOME | End: 2019-02-06
Payer: COMMERCIAL

## 2019-02-06 VITALS — DIASTOLIC BLOOD PRESSURE: 78 MMHG | SYSTOLIC BLOOD PRESSURE: 112 MMHG

## 2019-02-06 DIAGNOSIS — K04.7: Primary | ICD-10-CM

## 2019-02-06 DIAGNOSIS — F17.210: ICD-10-CM

## 2019-02-06 LAB
ALBUMIN SERPL BCG-MCNC: 4.1 G/DL (ref 3.2–5.2)
ALBUMIN/GLOB SERPL: 1.5 {RATIO} (ref 1–3)
ALP SERPL-CCNC: 66 U/L (ref 34–104)
ALT SERPL W P-5'-P-CCNC: 16 U/L (ref 7–52)
ANION GAP SERPL CALC-SCNC: 6 MMOL/L (ref 2–11)
AST SERPL-CCNC: 15 U/L (ref 13–39)
BUN SERPL-MCNC: 15 MG/DL (ref 6–24)
BUN/CREAT SERPL: 22.7 (ref 8–20)
CALCIUM SERPL-MCNC: 9.3 MG/DL (ref 8.6–10.3)
CHLORIDE SERPL-SCNC: 104 MMOL/L (ref 101–111)
GLOBULIN SER CALC-MCNC: 2.7 G/DL (ref 2–4)
GLUCOSE SERPL-MCNC: 83 MG/DL (ref 70–100)
HCG SERPL QL: < 0.6 MIU/ML
HCO3 SERPL-SCNC: 32 MMOL/L (ref 22–32)
POTASSIUM SERPL-SCNC: 4.1 MMOL/L (ref 3.5–5)
PROT SERPL-MCNC: 6.8 G/DL (ref 6.4–8.9)
SODIUM SERPL-SCNC: 142 MMOL/L (ref 135–145)

## 2019-02-06 PROCEDURE — 36415 COLL VENOUS BLD VENIPUNCTURE: CPT

## 2019-02-06 PROCEDURE — 84702 CHORIONIC GONADOTROPIN TEST: CPT

## 2019-02-06 PROCEDURE — 80053 COMPREHEN METABOLIC PANEL: CPT

## 2019-02-06 PROCEDURE — 99282 EMERGENCY DEPT VISIT SF MDM: CPT

## 2019-02-06 NOTE — ED
Throat Pain/Nasal Congestion





- HPI Summary


HPI Summary: 


Patient is a 38-year-old female presenting with persistent right sided dental 

pain. Patient was seen at the Ed for the same issue on Saturday. Patient 

followed up with [facility] the next day, who prescribed amoxicillin. 

Patient notes swelling has improved some, but has continued 8/10 pain into the 

jaw bone. Patient has been taking antibiotics as prescribed, and naproxen for 

pain management. Expresses concern with location of pain and persistent nature.

   





- History of Current Complaint


Chief Complaint: EDDentalPain


Time Seen by Provider: 02/06/19 14:17


Hx Obtained From: Patient


Onset/Duration: Gradual Onset


Severity: Moderate


Associated Signs And Symptoms: Positive: Negative





- Allergies/Home Medications


Allergies/Adverse Reactions: 


 Allergies











Allergy/AdvReac Type Severity Reaction Status Date / Time


 


No Known Allergies Allergy   Verified 11/30/18 23:59














PMH/Surg Hx/FS Hx/Imm Hx


Endocrine/Hematology History: 


   Denies: Hx Diabetes


Cardiovascular History: 


   Denies: Hx Hypertension, Hx Myocardial Infarction


Sensory History: 


   Denies: Hx Legally Blind, Hx Deafness


Opthamlomology History: 


   Denies: Hx Legally Blind


Psychiatric History: Reports: Hx Anxiety, Hx Panic Disorder





- Surgical History


Surgery Procedure, Year, and Place: none


Infectious Disease History: No


Infectious Disease History: 


   Denies: Traveled Outside the [place] in Last 30 Days





- Family History


Known Family History: 


   Negative: Cardiac Disease





- Social History


Alcohol Use: None


Substance Use Type: Reports: Marijuana


Substance Use Comment - Amount & Last Used: h/o opiate use - on suboxone and 

sober x 3 years


Hx Tobacco Use: Yes


Smoking Status (MU): Current Every Day Smoker


Type: Cigarettes


Amount Used/How Often: 1/2 PPD





Review of Systems


Constitutional: Negative


Eyes: Negative


Positive: Dental Pain - bottom molars, and right jaw 


Cardiovascular: Negative


Respiratory: Negative


Gastrointestinal: Negative


Skin: Negative


Neurological: Negative


All Other Systems Reviewed And Are Negative: Yes





Physical Exam


Triage Information Reviewed: Yes


Vital Signs On Initial Exam: 


 Initial Vitals











Temp Pulse Resp BP Pulse Ox


 


 98.0 F   80   14   112/78   100 


 


 02/06/19 13:55  02/06/19 13:55  02/06/19 13:55  02/06/19 13:55  02/06/19 13:55











Vital Signs Reviewed: Yes


Appearance: Positive: Well-Nourished, Pain Distress


Skin: Positive: Warm, Dry, Other - Slight localized swelling to the right side 

of jaw without erythema or flutuance felt


Eyes: Positive: Normal, EOMI, DAJUAN


ENT: Positive: Pharynx normal, Dental tenderness - bottom right molars.  

Negative: Nasal congestion, Tonsillar swelling, Tonsillar exudate


Dental: Positive: Percussion Tenderness @ - right jaw, Gross Decay/Caries @ - 

bottom 1st molar


Neck: Positive: Supple, Nontender, Enlarged Nodes @ - submandibular


Psychiatric: Positive: Normal


AVPU Assessment: Alert





Diagnostics





- Vital Signs


 Vital Signs











  Temp Pulse Resp BP Pulse Ox


 


 02/06/19 13:55  98.0 F  80  14  112/78  100














- Laboratory


Result Diagrams: 


 02/06/19 15:27


Lab Statement: Any lab studies that have been ordered have been reviewed, and 

results considered in the medical decision making process.





Re-Evaluation





- Re-Evaluation


  ** First Eval


Re-Evaluation Time: 17:00


Comment: patient went outside to smoke with IV and when advised to come back in 

stayed outside. patient then came back and explained that can not have patient 

outside with IV. as has stable vitals and patient is at risk of elopatment with 

IV pulled IV and will discharge. patient requesting liquid antibiotic so will 

place on augmentin.





EENT Course/Dx





- Course


Course Of Treatment: Patient preseted to the ed with persistant right jaw pain. 

Patient was seen at our facility on Saturday for pain and swelling due to 

dental abscess. Patient was placed on amoxicillin by [facility] on Sunday. 

Swelling has reduced, but patient is concerned that pain is exteded into jaw. 

Currently taking naproxen for pain management. On clinical exam slight swelling 

noted along the lower right mandible, swelling without heat or erythema. 

Dentition is poor with significant decay to the right 1st molar. ordered labs 

and were unable to get cbc due to patient poor veins. will waiting for renal 

function for CT patient went outside to smoke with IV present and would not 

come right back in. discussed that can not have patient with IV outside so 

pulled IV. as do not suspect patient is septic at the moment will discharge to 

have follow up with dentist. no appreciable area to drain at the moment either. 

patient requesting liquid antibiotics so will change to augmentin. patient 

understand and agrees with plan.





- Differential Diagnoses


Differential Diagnoses: Cellulitis, Dental Abscess, Dental Caries, Gingivitis, 

Periodontic Abscess, Periodontic Disease





- Diagnoses


Provider Diagnoses: 


 Dental infection








Discharge





- Sign-Out/Discharge


Documenting (check all that apply): Patient Departure


Patient Received Moderate/Deep Sedation with Procedure: No





- Discharge Plan


Condition: Good


Disposition: HOME


Prescriptions: 


Amoxicillin/Clavulanate SUSP* [Augmentin SUSP*] 800 mg PO BID #1 btl


Patient Education Materials:  Dental Abscess (ED)


Referrals: 


Caitlin Velasquez MD [Primary Care Provider] - 


Additional Instructions: 


start augmentin 10ml twice a day for 7 days


place ice on the area


Take tyenlol or ibuprofen for pain every 6 hours


Follow up with dentist


avoid hard crunchy food


Return to ED if develop any new or worsening symptoms





- Billing Disposition and Condition


Condition: GOOD


Disposition: Home

## 2019-04-18 ENCOUNTER — HOSPITAL ENCOUNTER (OUTPATIENT)
Dept: HOSPITAL 25 - ED | Age: 39
Setting detail: OBSERVATION
LOS: 1 days | Discharge: HOME | End: 2019-04-19
Attending: OBSTETRICS & GYNECOLOGY | Admitting: OBSTETRICS & GYNECOLOGY
Payer: MEDICAID

## 2019-04-18 DIAGNOSIS — F17.210: ICD-10-CM

## 2019-04-18 DIAGNOSIS — R10.9: ICD-10-CM

## 2019-04-18 DIAGNOSIS — K59.00: ICD-10-CM

## 2019-04-18 DIAGNOSIS — O00.90: Primary | ICD-10-CM

## 2019-04-18 LAB
ALBUMIN SERPL BCG-MCNC: 4.6 G/DL (ref 3.2–5.2)
ALBUMIN/GLOB SERPL: 1.9 {RATIO} (ref 1–3)
ALP SERPL-CCNC: 73 U/L (ref 34–104)
ALT SERPL W P-5'-P-CCNC: 30 U/L (ref 7–52)
AMYLASE SERPL-CCNC: 37 U/L (ref 29–103)
ANION GAP SERPL CALC-SCNC: 6 MMOL/L (ref 2–11)
APTT PPP: 24.9 SECONDS (ref 26–36.3)
AST SERPL-CCNC: 37 U/L (ref 13–39)
BASOPHILS # BLD AUTO: 0.1 10^3/UL (ref 0–0.2)
BUN SERPL-MCNC: 12 MG/DL (ref 6–24)
BUN/CREAT SERPL: 13.5 (ref 8–20)
CALCIUM SERPL-MCNC: 9.2 MG/DL (ref 8.6–10.3)
CHLORIDE SERPL-SCNC: 105 MMOL/L (ref 101–111)
EOSINOPHIL # BLD AUTO: 0.1 10^3/UL (ref 0–0.6)
GLOBULIN SER CALC-MCNC: 2.4 G/DL (ref 2–4)
GLUCOSE SERPL-MCNC: 133 MG/DL (ref 70–100)
HCO3 SERPL-SCNC: 30 MMOL/L (ref 22–32)
HCT VFR BLD AUTO: 39 % (ref 33–41)
HGB BLD-MCNC: 12.9 G/DL (ref 12–16)
INR PPP/BLD: 1.17 (ref 0.77–1.02)
LYMPHOCYTES # BLD AUTO: 1.6 10^3/UL (ref 1–4.8)
MAGNESIUM SERPL-MCNC: 2 MG/DL (ref 1.9–2.7)
MCH RBC QN AUTO: 29 PG (ref 27–31)
MCHC RBC AUTO-ENTMCNC: 33 G/DL (ref 31–36)
MCV RBC AUTO: 88 FL (ref 80–97)
MONOCYTES # BLD AUTO: 0.5 10^3/UL (ref 0–0.8)
NEUTROPHILS # BLD AUTO: 10.3 10^3/UL (ref 1.5–7.7)
NRBC # BLD AUTO: 0 10^3/UL
NRBC BLD QL AUTO: 0.1
PLATELET # BLD AUTO: 201 10^3/UL (ref 150–450)
POTASSIUM SERPL-SCNC: 3.5 MMOL/L (ref 3.5–5)
PROT SERPL-MCNC: 7 G/DL (ref 6.4–8.9)
RBC # BLD AUTO: 4.4 10^6 /UL (ref 3.7–4.87)
SODIUM SERPL-SCNC: 141 MMOL/L (ref 135–145)
WBC # BLD AUTO: 12.6 10^3/UL (ref 3.5–10.8)

## 2019-04-18 PROCEDURE — 83735 ASSAY OF MAGNESIUM: CPT

## 2019-04-18 PROCEDURE — 80053 COMPREHEN METABOLIC PANEL: CPT

## 2019-04-18 PROCEDURE — 99284 EMERGENCY DEPT VISIT MOD MDM: CPT

## 2019-04-18 PROCEDURE — 87040 BLOOD CULTURE FOR BACTERIA: CPT

## 2019-04-18 PROCEDURE — 83690 ASSAY OF LIPASE: CPT

## 2019-04-18 PROCEDURE — 96361 HYDRATE IV INFUSION ADD-ON: CPT

## 2019-04-18 PROCEDURE — 71045 X-RAY EXAM CHEST 1 VIEW: CPT

## 2019-04-18 PROCEDURE — G0378 HOSPITAL OBSERVATION PER HR: HCPCS

## 2019-04-18 PROCEDURE — 96375 TX/PRO/DX INJ NEW DRUG ADDON: CPT

## 2019-04-18 PROCEDURE — 76856 US EXAM PELVIC COMPLETE: CPT

## 2019-04-18 PROCEDURE — 86900 BLOOD TYPING SEROLOGIC ABO: CPT

## 2019-04-18 PROCEDURE — 85610 PROTHROMBIN TIME: CPT

## 2019-04-18 PROCEDURE — 86850 RBC ANTIBODY SCREEN: CPT

## 2019-04-18 PROCEDURE — 88305 TISSUE EXAM BY PATHOLOGIST: CPT

## 2019-04-18 PROCEDURE — 96374 THER/PROPH/DIAG INJ IV PUSH: CPT

## 2019-04-18 PROCEDURE — 86901 BLOOD TYPING SEROLOGIC RH(D): CPT

## 2019-04-18 PROCEDURE — 86140 C-REACTIVE PROTEIN: CPT

## 2019-04-18 PROCEDURE — 85730 THROMBOPLASTIN TIME PARTIAL: CPT

## 2019-04-18 PROCEDURE — 82150 ASSAY OF AMYLASE: CPT

## 2019-04-18 PROCEDURE — 36415 COLL VENOUS BLD VENIPUNCTURE: CPT

## 2019-04-18 PROCEDURE — 84702 CHORIONIC GONADOTROPIN TEST: CPT

## 2019-04-18 PROCEDURE — 85025 COMPLETE CBC W/AUTO DIFF WBC: CPT

## 2019-04-18 PROCEDURE — 83605 ASSAY OF LACTIC ACID: CPT

## 2019-04-18 PROCEDURE — 74019 RADEX ABDOMEN 2 VIEWS: CPT

## 2019-04-18 NOTE — ED
Abdominal Pain/Female





- HPI Summary


HPI Summary: 


This patient is a 38 year old F presenting to [facility] with a chief complaint of 

diffuse abdominal pain since 20:15. The patient rates the pain 10/10 in 

severity. Symptoms aggravated by nothing. Symptoms alleviated by nothing. 

Patient reports syncope x1, diaphoresis, no BM in 2 weeks. Patient denies EtOH 

or drug use. The patient notes that she just got out of [facility] senior care 1 day ago 

after 30 days. Patient denies possibility of pregnancy.








- History of Current Complaint


Chief Complaint: EDAbdPain


Stated Complaint: SHE KEEPS PASSING OUT PER SIGNIFICANT OTHER


Time Seen by Provider: 04/18/19 21:03


Hx Obtained From: Patient


Onset/Duration: Sudden Onset, Lasting Minutes, Still Present


Timing: Constant


Severity Initially: Severe


Severity Currently: Severe


Pain Intensity: 10


Pain Scale Used: 0-10 Numeric


Location: Diffuse


Radiates: No


Aggravating Factor(s): Nothing


Alleviating Factor(s): Nothing


Associated Signs and Symptoms: Positive: Diaphoresis, Constipation - no BM in 2 

weeks, Other: - syncope


Allergies/Adverse Reactions: 


 Allergies











Allergy/AdvReac Type Severity Reaction Status Date / Time


 


No Known Allergies Allergy   Verified 03/15/19 08:47











Home Medications: 


 Home Medications





NK [No Home Medications Reported]  04/19/19 [History Confirmed 04/19/19]











PMH/Surg Hx/FS Hx/Imm Hx


Endocrine/Hematology History: 


   Denies: Hx Diabetes, Hx Thyroid Disease


Cardiovascular History: Reports: Other Cardiovascular Problems/Disorders - 

PALPATATIONS


   Denies: Hx Hypertension, Hx Myocardial Infarction


Respiratory History: 


   Denies: Hx Asthma, Hx Chronic Obstructive Pulmonary Disease (COPD)


GI History: 


   Denies: Hx Ulcer


 History: Reports: Hx Kidney Stones, Hx Renal Disease - Kidney stones


Sensory History: 


   Denies: Hx Cataracts, Hx Contacts or Glasses, Hx Eye Injury, Hx Legally Blind

, Hx Deafness, Hx Hearing Aid


Opthamlomology History: 


   Denies: Hx Cataracts, Hx Contacts or Glasses, Hx Eye Injury, Hx Legally Blind


Psychiatric History: Reports: Hx Anxiety, Hx Attention Deficit Hyperactivity 

Disorder, Hx Depression, Hx Panic Disorder, Hx Inpatient Treatment, Hx 

Community Mental Health Tx, Hx Schizophrenia, Hx Substance Abuse


   Denies: Hx Eating Disorder, Hx of Violent Episodes Against Others





- Surgical History


Surgery Procedure, Year, and Place: none





- Immunization History


Date of Tetanus Vaccine: not sure


Infectious Disease History: Unable to Obtain/Confirm


Infectious Disease History: 


   Denies: Hx Hepatitis, Hx Human Immunodeficiency Virus (HIV), Traveled 

Outside the [place] in Last 30 Days





- Family History


Known Family History: 


   Negative: Cardiac Disease, Hypertension, Diabetes, Blood Disorder





- Social History


Alcohol Use: None


Hx Substance Use: Yes


Substance Use Type: Reports: Marijuana, Heroin


Substance Use Comment - Amount & Last Used: States sober 3 years


Hx Tobacco Use: Yes


Smoking Status (MU): Current Every Day Smoker


Type: Cigarettes


Amount Used/How Often: 1/2 PPD





Review of Systems


Positive: Skin Diaphoresis


Negative: Epistaxis


Negative: Cough


Positive: Abdominal Pain, Other - constipation


Positive: Syncope


All Other Systems Reviewed And Are Negative: Yes





Physical Exam





- Summary


Physical Exam Summary: 


VITAL SIGNS: Reviewed.


GENERAL: Patient is a well-developed and nourished FEMALE who is lying 

comfortable in the stretcher. Patient is not in any acute respiratory distress.


HEAD AND FACE: No signs of trauma. No ecchymosis, hematomas or skull 

depressions. No sinus tenderness.


EYES: PERRLA, EOMI x 2, No injected conjunctiva, no nystagmus.


EARS: Hearing grossly intact. Ear canals and tympanic membranes are within 

normal limits.


MOUTH: Oropharynx within normal limits.


NECK: Supple, trachea is midline, no adenopathy, no JVD, no carotid bruit, no c-

spine tenderness, neck with full ROM.


CHEST: Symmetric, no tenderness at palpation


LUNGS: Clear to auscultation bilaterally. No wheezing or crackles.


CVS: Regular rate and rhythm, S1 and S2 present, no murmurs or gallops 

appreciated.


ABDOMEN: Soft, diffuse abd tenderness with rebound. No signs of distention. No 

guarding, and no masses palpated. Bowel sounds are normal.


EXTREMITIES: FROM in all major joints, no edema, no cyanosis or clubbing.


NEURO: Alert and oriented x 3. No acute neurological deficits. Speech is normal 

and follows commands.


SKIN: Dry and warm





Triage Information Reviewed: Yes


Vital Signs On Initial Exam: 


 Initial Vitals











Temp Pulse Resp BP Pulse Ox


 


 0 F   84   16   60/42   98 


 


 04/18/19 20:45  04/18/19 20:45  04/18/19 20:45  04/18/19 20:45  04/18/19 20:45











Vital Signs Reviewed: Yes





Diagnostics





- Vital Signs


 Vital Signs











  Temp Pulse Resp BP Pulse Ox


 


 04/18/19 22:08    17  118/77 


 


 04/18/19 22:03    21  


 


 04/18/19 22:00    18  


 


 04/18/19 21:58    18  106/68 


 


 04/18/19 21:49  97.6 F  63  15  99/60  100


 


 04/18/19 21:38    17  113/84 


 


 04/18/19 21:35    17  107/74 


 


 04/18/19 21:28   63  19  113/85  100


 


 04/18/19 21:18   60  22   100


 


 04/18/19 21:17   61  20  112/87  100


 


 04/18/19 20:45  0 F  84  16  60/42  98














- Laboratory


Lab Results: 


 Lab Results











  04/18/19 04/18/19 Range/Units





  21:47 21:47 


 


WBC  12.6 H   (3.5-10.8)  10^3/uL


 


RBC  4.40   (3.70-4.87)  10^6 /uL


 


Hgb  12.9   (12.0-16.0)  g/dL


 


Hct  39   (33-41)  %


 


MCV  88   (80-97)  fL


 


MCH  29   (27-31)  pg


 


MCHC  33   (31-36)  g/dL


 


RDW  14   (10.5-15)  %


 


Plt Count  201   (150-450)  10^3/uL


 


MPV  8.8   (7.4-10.4)  fL


 


Neut % (Auto)  81.4   %


 


Lymph % (Auto)  13.0   %


 


Mono % (Auto)  4.1   %


 


Eos % (Auto)  0.9   %


 


Baso % (Auto)  0.6   %


 


Absolute Neuts (auto)  10.3 H   (1.5-7.7)  10^3/ul


 


Absolute Lymphs (auto)  1.6   (1.0-4.8)  10^3/ul


 


Absolute Monos (auto)  0.5   (0-0.8)  10^3/ul


 


Absolute Eos (auto)  0.1   (0-0.6)  10^3/ul


 


Absolute Basos (auto)  0.1   (0-0.2)  10^3/ul


 


Absolute Nucleated RBC  0   10^3/ul


 


Nucleated RBC %  0.1   


 


INR (Anticoag Therapy)   1.17 H  (0.77-1.02)  


 


APTT   24.9 L  (26.0-36.3)  seconds











Result Diagrams: 


 04/18/19 21:47





 04/18/19 21:47


Lab Statement: Any lab studies that have been ordered have been reviewed, and 

results considered in the medical decision making process.





- Radiology


  ** CXR


Radiology Interpretation Completed By: ED Physician - Dr. Mccarthy, pending 

official report


Summary of Radiographic Findings: negative





  ** Abd XR


Radiology Interpretation Completed By: ED Physician - Dr. Mccarthy, pending 

official report


Summary of Radiographic Findings: increased stool constipation





- Additional Comments


Diagnostic Additional Comments: 


Pelvic US


Interpreted by radiologist.


IMPRESSION: 


1. No intrauterine gestation is seen. Findings may reflect recent spontaneous 


AB. Ectopic is not excluded and serial beta hCG levels may be of benefit for 


further evaluation. 


2. Minimal complex fluid around the uterus which may reflect hemoperitoneum 


which may be seen with ruptured ectopic, however, a specific site of ruptured 


ectopic is not seen. Ruptured hemorrhagic cyst is also not excluded. The right 


ovary is not seen as an indication of limited visualization. 


Dr. Mccarthy has reviewed this report.








Re-Evaluation





- Re-Evaluation


  ** 1st re-eval


Re-Evaluation Time: 22:55


Change: Unchanged


Comment: Discussed lab results with patient. The patient noted that her LNMP 

was 1 month ago and it was her first period since her IUD was removed 6 months 

ago. This period lasted 3 days without any cramping which is nml for her. She 

says she has not used any birth control since her IUD removal and she is 

sexually active. Patient says she has 3 kids at home and has no hx of 

miscarriages. Repeat physicial exam performed on patient revealed bilateral 

lower quadrant tenderness with L worse than R.





Abdominal Pain Fem Course/Dx





- Course


Course Of Treatment: This patient is a 38 year old F presenting to [facility] with a 

chief complaint of diffuse abdominal pain since 20:15. Patient reports syncope 

x1, diaphoresis, no BM in 2 weeks. Patient denies any bowel or urinary 

symptoms. Physical Exam Findings were diffuse abd tenderness with rebound. 

Neuro exam is intact. CXR reveals, per ED physician, negative. Abd XR reveals, 

per ED physician, increased stool constipation. US Pelvis reveals, per 

radiologist 1. No intrauterine gestation is seen. Findings may reflect recent 

spontaneous AB. Ectopic is not excluded and serial beta hCG levels may be of 

benefit for further evaluation. 2. Minimal complex fluid around the uterus 

which may reflect hemoperitoneum which may be seen with ruptured ectopic, 

however, a specific site of ruptured ectopic is not seen. Ruptured hemorrhagic 

cyst is also not excluded. The right ovary is not seen as an indication of 

limited visualization. ED physician has reviewed this radiology report. Labs 

obtained. In the ED course the patient was given IV fluids, morphine, and 

contrast. Upon re-eval at 22:55, discussed lab results with patient. The 

patient noted that her LNMP was 1 month ago and it was her first period since 

her IUD was removed 6 months ago. This period lasted 3 days without any 

cramping which is nml for her. She says she has not used any birth control 

since her IUD removal and she is sexually active. Patient says she has 3 kids 

at home and has no hx of miscarriages. Repeat physicial exam performed on 

patient revealed bilateral lower quadrant tenderness with L worse than R. At 23

: 45 Dr. Turner requested transvaginal US. Discussed patient care with Dr. Turner, OB, who agreed to come see the patient in the ED. Patient will be 

admitted to OB. Dx ectopic pregnancy. Patient is agreeable with this plan.





- Diagnoses


Provider Diagnoses: 


 Ectopic pregnancy








- Provider Notifications


Discussed Care Of Patient With: Raman Turner


Time Discussed With Above Provider: 23:30


Instructed by Provider To: MD Will See In ED - Dr. Turner saw the patient in 

the ED and requested a transvaginal US be taken





Discharge





- Sign-Out/Discharge


Documenting (check all that apply): Patient Departure - admit to OB


Patient Received Moderate/Deep Sedation with Procedure: No





- Discharge Plan


Condition: Stable


Disposition: TRANSFER TO OB ([facility])





- Attestation Statements


Document Initiated by Scribe: Yes


Documenting Scribe: Pura Downs


Provider For Whom Scribe is Documenting (Include Credential): Malgorzata Mccarthy MD


Scribe Attestation: 


Pura MCCORMICK, scribed for Malgorzata Mccarthy MD on 04/19/19 at 0156. 


Status of Scribe Document: Ready

## 2019-04-19 VITALS — SYSTOLIC BLOOD PRESSURE: 130 MMHG | DIASTOLIC BLOOD PRESSURE: 78 MMHG

## 2019-04-19 RX ADMIN — OXYCODONE HYDROCHLORIDE AND ACETAMINOPHEN PRN TAB: 5; 325 TABLET ORAL at 09:56

## 2019-04-19 RX ADMIN — OXYCODONE HYDROCHLORIDE AND ACETAMINOPHEN PRN TAB: 5; 325 TABLET ORAL at 04:57

## 2019-04-19 NOTE — OP
CC:  Dr. Josiah Freeman.*



DATE OF OPERATION:  04/19/19 - ROOM #341

 

DATE OF BIRTH:  [birth year]

 

SURGEON:  Raman Turner MD.

 

ASSISTANT:  Dr. Josiah Freeman.

 

ANESTHESIA:  General endotracheal tube.

 

PRE-OP DIAGNOSIS: Ruptured Right ectopic pregnancy.

 

POST-OP DIAGNOSIS:  Ruptured Right ectopic pregnancy.

 

OPERATIVE PROCEDURE:  Laparoscopic removal of right tube and ectopic.

 

ESTIMATED BLOOD LOSS:  Minimal.

 

FLUIDS:  Intraoperatively 300 cc noted in the pelvis.

 

SPECIMEN:  Includes right tube and ectopic.

 

FINDINGS:  On laparoscopy, the anterior bladder flap appeared normal.  The cul-
de-sac had approximately 300 cc of blood and clot.  The left fallopian tube and 
ovary appeared normal.  The right fallopian tube contained an isthmic portion 
ectopic pregnancy.There was some active bleeding from the ectopic.  The right 
ovary appeared normal.  Liver surface was smooth.

 

DESCRIPTION OF PROCEDURE:  The patient identified, procedure identified as a 
right ectopic pregnancy and laparoscopy.  The patient was taken to the 
operating room, prepped and draped in the usual fashion in the dorsal lithotomy 
position under general anesthesia.  A sponge stick was placed in the vagina.  A 
small infraumbilical incision was made and a Veress needle was inserted through 
this. The abdomen was insufflated to 15 mmHg.  The Veress needle was removed 
and a trocar was inserted using a Visiport under visualization.  The above 
findings were noted. A second trocar was placed on the right abdominal sidewall 
under direct visualization about 8 cm lateral to the umbilicus, and on the left 
side, about 8 cm lateral to the umbilicus under direct visualization.  The 
LigaSure was used to grasp the fimbriated end.  The right fallopian tube was 
coagulated and incised at the mesosalpinx between the ovary and the fallopian 
tube and brought out just below the ectopic pregnancy, again excising the tube 
and the ectopic using the LigaSure. This was done until all that was left was a 
stump of the right fallopian tube on the right side.  The right ectopic was 
placed within an EndoCatch and brought out through the umbilical incision.  
Trocar was replaced.  Copious irrigation was utilized and suctioned out until 
most of the blood and clot had been removed.  Once removed from the abdomen, 
the abdomen was deflated of CO2.  The umbilicus was closed deeply using 2-0 
Vicryl in a figure-of-eight fashion and a 3-0 Vicryl used to close the 
umbilicus subcuticularly and then skin glue was used on the side wall and on 
the umbilicus.  The sponge and sponge stick removed from the vagina and the 
Emanuel catheter was removed and the patient returned to recovery room in stable 
condition.  All sponge and instrument counts were correct.

 

 [id number]/CPS #: [id number]

MTDD

## 2019-05-24 ENCOUNTER — HOSPITAL ENCOUNTER (EMERGENCY)
Dept: HOSPITAL 25 - ED | Age: 39
LOS: 1 days | Discharge: HOME | End: 2019-05-25
Payer: COMMERCIAL

## 2019-05-24 DIAGNOSIS — R00.2: ICD-10-CM

## 2019-05-24 DIAGNOSIS — R45.5: ICD-10-CM

## 2019-05-24 DIAGNOSIS — Z87.442: ICD-10-CM

## 2019-05-24 DIAGNOSIS — F22: ICD-10-CM

## 2019-05-24 DIAGNOSIS — Z87.891: ICD-10-CM

## 2019-05-24 DIAGNOSIS — Z86.79: ICD-10-CM

## 2019-05-24 DIAGNOSIS — F19.10: Primary | ICD-10-CM

## 2019-05-24 LAB
ALBUMIN SERPL BCG-MCNC: 4.6 G/DL (ref 3.2–5.2)
ALBUMIN/GLOB SERPL: 1.9 {RATIO} (ref 1–3)
ALP SERPL-CCNC: 69 U/L (ref 34–104)
ALT SERPL W P-5'-P-CCNC: 14 U/L (ref 7–52)
ANION GAP SERPL CALC-SCNC: 5 MMOL/L (ref 2–11)
AST SERPL-CCNC: 18 U/L (ref 13–39)
BASOPHILS # BLD AUTO: 0.1 10^3/UL (ref 0–0.2)
BUN SERPL-MCNC: 12 MG/DL (ref 6–24)
BUN/CREAT SERPL: 14.5 (ref 8–20)
CALCIUM SERPL-MCNC: 9.6 MG/DL (ref 8.6–10.3)
CHLORIDE SERPL-SCNC: 103 MMOL/L (ref 101–111)
EOSINOPHIL # BLD AUTO: 0.2 10^3/UL (ref 0–0.6)
GLOBULIN SER CALC-MCNC: 2.4 G/DL (ref 2–4)
GLUCOSE SERPL-MCNC: 118 MG/DL (ref 70–100)
HCG SERPL QL: 1 MIU/ML
HCO3 SERPL-SCNC: 30 MMOL/L (ref 22–32)
HCT VFR BLD AUTO: 38 % (ref 35–47)
HGB BLD-MCNC: 12.8 G/DL (ref 12–16)
LYMPHOCYTES # BLD AUTO: 1.7 10^3/UL (ref 1–4.8)
MCH RBC QN AUTO: 30 PG (ref 27–31)
MCHC RBC AUTO-ENTMCNC: 34 G/DL (ref 31–36)
MCV RBC AUTO: 87 FL (ref 80–97)
MONOCYTES # BLD AUTO: 0.4 10^3/UL (ref 0–0.8)
NEUTROPHILS # BLD AUTO: 3.8 10^3/UL (ref 1.5–7.7)
NRBC # BLD AUTO: 0 10^3/UL
NRBC BLD QL AUTO: 0.1
PLATELET # BLD AUTO: 226 10^3/UL (ref 150–450)
POTASSIUM SERPL-SCNC: 3.5 MMOL/L (ref 3.5–5)
PROT SERPL-MCNC: 7 G/DL (ref 6.4–8.9)
RBC # BLD AUTO: 4.33 10^6 /UL (ref 3.7–4.87)
SODIUM SERPL-SCNC: 138 MMOL/L (ref 135–145)
TSH SERPL-ACNC: 3.62 MCIU/ML (ref 0.34–5.6)
WBC # BLD AUTO: 6.1 10^3/UL (ref 3.5–10.8)

## 2019-05-24 PROCEDURE — 80320 DRUG SCREEN QUANTALCOHOLS: CPT

## 2019-05-24 PROCEDURE — 99285 EMERGENCY DEPT VISIT HI MDM: CPT

## 2019-05-24 PROCEDURE — 96372 THER/PROPH/DIAG INJ SC/IM: CPT

## 2019-05-24 PROCEDURE — 80053 COMPREHEN METABOLIC PANEL: CPT

## 2019-05-24 PROCEDURE — 84702 CHORIONIC GONADOTROPIN TEST: CPT

## 2019-05-24 PROCEDURE — 85025 COMPLETE CBC W/AUTO DIFF WBC: CPT

## 2019-05-24 PROCEDURE — 84443 ASSAY THYROID STIM HORMONE: CPT

## 2019-05-24 PROCEDURE — 36415 COLL VENOUS BLD VENIPUNCTURE: CPT

## 2019-05-24 PROCEDURE — G0480 DRUG TEST DEF 1-7 CLASSES: HCPCS
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
## 2019-05-25 VITALS — SYSTOLIC BLOOD PRESSURE: 98 MMHG | DIASTOLIC BLOOD PRESSURE: 52 MMHG
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
